# Patient Record
Sex: FEMALE | Race: WHITE | NOT HISPANIC OR LATINO
[De-identification: names, ages, dates, MRNs, and addresses within clinical notes are randomized per-mention and may not be internally consistent; named-entity substitution may affect disease eponyms.]

---

## 2018-03-19 PROBLEM — Z00.00 ENCOUNTER FOR PREVENTIVE HEALTH EXAMINATION: Status: ACTIVE | Noted: 2018-03-19

## 2018-04-02 ENCOUNTER — NON-APPOINTMENT (OUTPATIENT)
Age: 83
End: 2018-04-02

## 2018-04-02 ENCOUNTER — APPOINTMENT (OUTPATIENT)
Dept: CARDIOTHORACIC SURGERY | Facility: CLINIC | Age: 83
End: 2018-04-02
Payer: MEDICARE

## 2018-04-02 ENCOUNTER — OUTPATIENT (OUTPATIENT)
Dept: OUTPATIENT SERVICES | Facility: HOSPITAL | Age: 83
LOS: 1 days | End: 2018-04-02
Payer: MEDICARE

## 2018-04-02 VITALS
HEIGHT: 61 IN | RESPIRATION RATE: 18 BRPM | DIASTOLIC BLOOD PRESSURE: 61 MMHG | SYSTOLIC BLOOD PRESSURE: 128 MMHG | HEART RATE: 79 BPM | OXYGEN SATURATION: 96 % | BODY MASS INDEX: 24.17 KG/M2 | TEMPERATURE: 97.8 F | WEIGHT: 128 LBS

## 2018-04-02 VITALS
OXYGEN SATURATION: 96 % | HEIGHT: 61 IN | DIASTOLIC BLOOD PRESSURE: 61 MMHG | TEMPERATURE: 97.8 F | RESPIRATION RATE: 18 BRPM | BODY MASS INDEX: 24.17 KG/M2 | WEIGHT: 128 LBS | SYSTOLIC BLOOD PRESSURE: 128 MMHG | HEART RATE: 79 BPM

## 2018-04-02 DIAGNOSIS — Z85.038 PERSONAL HISTORY OF OTHER MALIGNANT NEOPLASM OF LARGE INTESTINE: ICD-10-CM

## 2018-04-02 DIAGNOSIS — I35.0 NONRHEUMATIC AORTIC (VALVE) STENOSIS: ICD-10-CM

## 2018-04-02 DIAGNOSIS — Z86.39 PERSONAL HISTORY OF OTHER ENDOCRINE, NUTRITIONAL AND METABOLIC DISEASE: ICD-10-CM

## 2018-04-02 LAB
ALBUMIN SERPL ELPH-MCNC: 3.9 G/DL — SIGNIFICANT CHANGE UP (ref 3.3–5)
ALP SERPL-CCNC: 54 U/L — SIGNIFICANT CHANGE UP (ref 40–120)
ALT FLD-CCNC: 13 U/L — SIGNIFICANT CHANGE UP (ref 10–45)
ANION GAP SERPL CALC-SCNC: 12 MMOL/L — SIGNIFICANT CHANGE UP (ref 5–17)
APTT BLD: 32.8 SEC — SIGNIFICANT CHANGE UP (ref 27.5–37.4)
AST SERPL-CCNC: 19 U/L — SIGNIFICANT CHANGE UP (ref 10–40)
BASOPHILS NFR BLD AUTO: 0.2 % — SIGNIFICANT CHANGE UP (ref 0–2)
BILIRUB SERPL-MCNC: 0.4 MG/DL — SIGNIFICANT CHANGE UP (ref 0.2–1.2)
BUN SERPL-MCNC: 24 MG/DL — HIGH (ref 7–23)
CALCIUM SERPL-MCNC: 9.4 MG/DL — SIGNIFICANT CHANGE UP (ref 8.4–10.5)
CHLORIDE SERPL-SCNC: 102 MMOL/L — SIGNIFICANT CHANGE UP (ref 96–108)
CO2 SERPL-SCNC: 28 MMOL/L — SIGNIFICANT CHANGE UP (ref 22–31)
CREAT SERPL-MCNC: 0.98 MG/DL — SIGNIFICANT CHANGE UP (ref 0.5–1.3)
EOSINOPHIL NFR BLD AUTO: 1.4 % — SIGNIFICANT CHANGE UP (ref 0–6)
GLUCOSE SERPL-MCNC: 160 MG/DL — HIGH (ref 70–99)
HCT VFR BLD CALC: 34.7 % — SIGNIFICANT CHANGE UP (ref 34.5–45)
HGB BLD-MCNC: 11.8 G/DL — SIGNIFICANT CHANGE UP (ref 11.5–15.5)
INR BLD: 1.04 — SIGNIFICANT CHANGE UP (ref 0.88–1.16)
LYMPHOCYTES # BLD AUTO: 24.6 % — SIGNIFICANT CHANGE UP (ref 13–44)
MCHC RBC-ENTMCNC: 32.8 PG — SIGNIFICANT CHANGE UP (ref 27–34)
MCHC RBC-ENTMCNC: 34 G/DL — SIGNIFICANT CHANGE UP (ref 32–36)
MCV RBC AUTO: 96.4 FL — SIGNIFICANT CHANGE UP (ref 80–100)
MONOCYTES NFR BLD AUTO: 9.3 % — SIGNIFICANT CHANGE UP (ref 2–14)
NEUTROPHILS NFR BLD AUTO: 64.5 % — SIGNIFICANT CHANGE UP (ref 43–77)
NT-PROBNP SERPL-SCNC: 1649 PG/ML — HIGH (ref 0–300)
PLATELET # BLD AUTO: 164 K/UL — SIGNIFICANT CHANGE UP (ref 150–400)
POTASSIUM SERPL-MCNC: 3.7 MMOL/L — SIGNIFICANT CHANGE UP (ref 3.5–5.3)
POTASSIUM SERPL-SCNC: 3.7 MMOL/L — SIGNIFICANT CHANGE UP (ref 3.5–5.3)
PROT SERPL-MCNC: 6.9 G/DL — SIGNIFICANT CHANGE UP (ref 6–8.3)
PROTHROM AB SERPL-ACNC: 11.5 SEC — SIGNIFICANT CHANGE UP (ref 9.8–12.7)
RBC # BLD: 3.6 M/UL — LOW (ref 3.8–5.2)
RBC # FLD: 14.7 % — SIGNIFICANT CHANGE UP (ref 10.3–16.9)
SODIUM SERPL-SCNC: 142 MMOL/L — SIGNIFICANT CHANGE UP (ref 135–145)
WBC # BLD: 5 K/UL — SIGNIFICANT CHANGE UP (ref 3.8–10.5)
WBC # FLD AUTO: 5 K/UL — SIGNIFICANT CHANGE UP (ref 3.8–10.5)

## 2018-04-02 PROCEDURE — 85730 THROMBOPLASTIN TIME PARTIAL: CPT

## 2018-04-02 PROCEDURE — 80053 COMPREHEN METABOLIC PANEL: CPT

## 2018-04-02 PROCEDURE — 36415 COLL VENOUS BLD VENIPUNCTURE: CPT

## 2018-04-02 PROCEDURE — 85610 PROTHROMBIN TIME: CPT

## 2018-04-02 PROCEDURE — 85025 COMPLETE CBC W/AUTO DIFF WBC: CPT

## 2018-04-02 PROCEDURE — 99204 OFFICE O/P NEW MOD 45 MIN: CPT

## 2018-04-02 PROCEDURE — 83880 ASSAY OF NATRIURETIC PEPTIDE: CPT

## 2018-04-02 PROCEDURE — 93000 ELECTROCARDIOGRAM COMPLETE: CPT

## 2018-04-03 DIAGNOSIS — I35.0 NONRHEUMATIC AORTIC (VALVE) STENOSIS: ICD-10-CM

## 2018-04-04 PROBLEM — Z86.39 HISTORY OF HYPERCHOLESTEROLEMIA: Status: RESOLVED | Noted: 2018-04-04 | Resolved: 2018-04-04

## 2018-04-04 PROBLEM — Z85.038 HISTORY OF COLON CANCER: Status: RESOLVED | Noted: 2018-04-04 | Resolved: 2018-04-04

## 2018-04-04 PROBLEM — I35.0 AORTIC STENOSIS: Status: ACTIVE | Noted: 2018-04-04

## 2018-04-04 RX ORDER — SIMVASTATIN 40 MG/1
40 TABLET, FILM COATED ORAL DAILY
Qty: 90 | Refills: 3 | Status: ACTIVE | COMMUNITY

## 2018-04-04 RX ORDER — ALPRAZOLAM 0.5 MG/1
0.5 TABLET ORAL
Refills: 0 | Status: ACTIVE | COMMUNITY

## 2018-04-17 RX ORDER — CHLORHEXIDINE GLUCONATE 213 G/1000ML
1 SOLUTION TOPICAL ONCE
Qty: 0 | Refills: 0 | Status: DISCONTINUED | OUTPATIENT
Start: 2018-04-19 | End: 2018-04-20

## 2018-04-18 VITALS
DIASTOLIC BLOOD PRESSURE: 83 MMHG | SYSTOLIC BLOOD PRESSURE: 178 MMHG | HEART RATE: 74 BPM | TEMPERATURE: 98 F | RESPIRATION RATE: 16 BRPM | HEIGHT: 61 IN | OXYGEN SATURATION: 98 % | WEIGHT: 177.91 LBS

## 2018-04-18 RX ORDER — CARVEDILOL PHOSPHATE 80 MG/1
0 CAPSULE, EXTENDED RELEASE ORAL
Qty: 0 | Refills: 0 | COMMUNITY

## 2018-04-18 NOTE — PATIENT PROFILE ADULT. - PSH
H/O Spinal surgery  lumbar  History of bunionectomy of both great toes    History of partial colectomy  2001

## 2018-04-19 ENCOUNTER — OUTPATIENT (OUTPATIENT)
Dept: INPATIENT UNIT | Facility: HOSPITAL | Age: 83
LOS: 1 days | Discharge: HOME | End: 2018-04-19
Payer: MEDICARE

## 2018-04-19 ENCOUNTER — APPOINTMENT (OUTPATIENT)
Dept: CARDIOTHORACIC SURGERY | Facility: HOSPITAL | Age: 83
End: 2018-04-19
Payer: MEDICARE

## 2018-04-19 DIAGNOSIS — I50.32 CHRONIC DIASTOLIC (CONGESTIVE) HEART FAILURE: ICD-10-CM

## 2018-04-19 DIAGNOSIS — Z98.890 OTHER SPECIFIED POSTPROCEDURAL STATES: Chronic | ICD-10-CM

## 2018-04-19 DIAGNOSIS — I25.10 ATHEROSCLEROTIC HEART DISEASE OF NATIVE CORONARY ARTERY WITHOUT ANGINA PECTORIS: ICD-10-CM

## 2018-04-19 DIAGNOSIS — I70.0 ATHEROSCLEROSIS OF AORTA: ICD-10-CM

## 2018-04-19 LAB
ALBUMIN SERPL ELPH-MCNC: 4.1 G/DL — SIGNIFICANT CHANGE UP (ref 3.3–5)
ALP SERPL-CCNC: 59 U/L — SIGNIFICANT CHANGE UP (ref 40–120)
ALT FLD-CCNC: 18 U/L — SIGNIFICANT CHANGE UP (ref 10–45)
ANION GAP SERPL CALC-SCNC: 12 MMOL/L — SIGNIFICANT CHANGE UP (ref 5–17)
APTT BLD: 31.7 SEC — SIGNIFICANT CHANGE UP (ref 27.5–37.4)
AST SERPL-CCNC: 25 U/L — SIGNIFICANT CHANGE UP (ref 10–40)
BASOPHILS NFR BLD AUTO: 0.2 % — SIGNIFICANT CHANGE UP (ref 0–2)
BILIRUB SERPL-MCNC: 0.5 MG/DL — SIGNIFICANT CHANGE UP (ref 0.2–1.2)
BLD GP AB SCN SERPL QL: NEGATIVE — SIGNIFICANT CHANGE UP
BUN SERPL-MCNC: 20 MG/DL — SIGNIFICANT CHANGE UP (ref 7–23)
CALCIUM SERPL-MCNC: 9.9 MG/DL — SIGNIFICANT CHANGE UP (ref 8.4–10.5)
CHLORIDE SERPL-SCNC: 103 MMOL/L — SIGNIFICANT CHANGE UP (ref 96–108)
CHOLEST SERPL-MCNC: 198 MG/DL — SIGNIFICANT CHANGE UP (ref 10–199)
CK MB CFR SERPL CALC: 2 NG/ML — SIGNIFICANT CHANGE UP (ref 0–6.7)
CK SERPL-CCNC: 70 U/L — SIGNIFICANT CHANGE UP (ref 25–170)
CO2 SERPL-SCNC: 27 MMOL/L — SIGNIFICANT CHANGE UP (ref 22–31)
CREAT SERPL-MCNC: 0.96 MG/DL — SIGNIFICANT CHANGE UP (ref 0.5–1.3)
EOSINOPHIL NFR BLD AUTO: 2.1 % — SIGNIFICANT CHANGE UP (ref 0–6)
GLUCOSE SERPL-MCNC: 107 MG/DL — HIGH (ref 70–99)
HBA1C BLD-MCNC: 5.6 % — SIGNIFICANT CHANGE UP (ref 4–5.6)
HCT VFR BLD CALC: 38.5 % — SIGNIFICANT CHANGE UP (ref 34.5–45)
HDLC SERPL-MCNC: 63 MG/DL — SIGNIFICANT CHANGE UP (ref 40–125)
HGB BLD-MCNC: 12.5 G/DL — SIGNIFICANT CHANGE UP (ref 11.5–15.5)
INR BLD: 1.09 — SIGNIFICANT CHANGE UP (ref 0.88–1.16)
LIPID PNL WITH DIRECT LDL SERPL: 113 MG/DL — SIGNIFICANT CHANGE UP
LYMPHOCYTES # BLD AUTO: 25.5 % — SIGNIFICANT CHANGE UP (ref 13–44)
MCHC RBC-ENTMCNC: 31.2 PG — SIGNIFICANT CHANGE UP (ref 27–34)
MCHC RBC-ENTMCNC: 32.5 G/DL — SIGNIFICANT CHANGE UP (ref 32–36)
MCV RBC AUTO: 96 FL — SIGNIFICANT CHANGE UP (ref 80–100)
MONOCYTES NFR BLD AUTO: 13.1 % — SIGNIFICANT CHANGE UP (ref 2–14)
NEUTROPHILS NFR BLD AUTO: 59.1 % — SIGNIFICANT CHANGE UP (ref 43–77)
PLATELET # BLD AUTO: 185 K/UL — SIGNIFICANT CHANGE UP (ref 150–400)
POTASSIUM SERPL-MCNC: 5.1 MMOL/L — SIGNIFICANT CHANGE UP (ref 3.5–5.3)
POTASSIUM SERPL-SCNC: 5.1 MMOL/L — SIGNIFICANT CHANGE UP (ref 3.5–5.3)
PROT SERPL-MCNC: 7.5 G/DL — SIGNIFICANT CHANGE UP (ref 6–8.3)
PROTHROM AB SERPL-ACNC: 12.1 SEC — SIGNIFICANT CHANGE UP (ref 9.8–12.7)
RBC # BLD: 4.01 M/UL — SIGNIFICANT CHANGE UP (ref 3.8–5.2)
RBC # FLD: 14.1 % — SIGNIFICANT CHANGE UP (ref 10.3–16.9)
RH IG SCN BLD-IMP: POSITIVE — SIGNIFICANT CHANGE UP
SODIUM SERPL-SCNC: 142 MMOL/L — SIGNIFICANT CHANGE UP (ref 135–145)
TOTAL CHOLESTEROL/HDL RATIO MEASUREMENT: 3.1 RATIO — LOW (ref 3.3–7.1)
TRIGL SERPL-MCNC: 109 MG/DL — SIGNIFICANT CHANGE UP (ref 10–149)
WBC # BLD: 5.3 K/UL — SIGNIFICANT CHANGE UP (ref 3.8–10.5)
WBC # FLD AUTO: 5.3 K/UL — SIGNIFICANT CHANGE UP (ref 3.8–10.5)

## 2018-04-19 PROCEDURE — 93454 CORONARY ARTERY ANGIO S&I: CPT | Mod: 26

## 2018-04-19 PROCEDURE — 93306 TTE W/DOPPLER COMPLETE: CPT | Mod: 26

## 2018-04-19 PROCEDURE — 99233 SBSQ HOSP IP/OBS HIGH 50: CPT | Mod: 25

## 2018-04-19 RX ORDER — ASPIRIN/CALCIUM CARB/MAGNESIUM 324 MG
81 TABLET ORAL ONCE
Qty: 0 | Refills: 0 | Status: COMPLETED | OUTPATIENT
Start: 2018-04-19 | End: 2018-04-19

## 2018-04-19 RX ORDER — CLOPIDOGREL BISULFATE 75 MG/1
300 TABLET, FILM COATED ORAL ONCE
Qty: 0 | Refills: 0 | Status: COMPLETED | OUTPATIENT
Start: 2018-04-19 | End: 2018-04-19

## 2018-04-19 RX ORDER — SODIUM CHLORIDE 9 MG/ML
1000 INJECTION INTRAMUSCULAR; INTRAVENOUS; SUBCUTANEOUS
Qty: 0 | Refills: 0 | Status: DISCONTINUED | OUTPATIENT
Start: 2018-04-19 | End: 2018-04-20

## 2018-04-19 RX ORDER — ASPIRIN/CALCIUM CARB/MAGNESIUM 324 MG
81 TABLET ORAL DAILY
Qty: 0 | Refills: 0 | Status: DISCONTINUED | OUTPATIENT
Start: 2018-04-20 | End: 2018-04-20

## 2018-04-19 RX ORDER — SODIUM CHLORIDE 9 MG/ML
500 INJECTION INTRAMUSCULAR; INTRAVENOUS; SUBCUTANEOUS
Qty: 0 | Refills: 0 | Status: DISCONTINUED | OUTPATIENT
Start: 2018-04-19 | End: 2018-04-20

## 2018-04-19 RX ADMIN — Medication 81 MILLIGRAM(S): at 16:47

## 2018-04-19 RX ADMIN — CLOPIDOGREL BISULFATE 300 MILLIGRAM(S): 75 TABLET, FILM COATED ORAL at 16:47

## 2018-04-19 NOTE — H&P ADULT - ASSESSMENT
88 y/o F with severe AS being evaluated for TAVR with R/L heart cath today   - consented for above procedure   - will admit for observation overnight

## 2018-04-19 NOTE — PROGRESS NOTE ADULT - SUBJECTIVE AND OBJECTIVE BOX
Discussed with Dr. Cobos    HPI  88 y/o F with history of HTN, HLD< Colon CA s/p resection (2001, no RTx or chemo), chornic diastolic CHF< with severe AS who was referred for further evaluation of her AS. The patient states for the last 6 months she has been experiencing dysnea, which has progressed to dyspnea with her daily household activities like getting dressed and going to the bathroom.  DYspnea is associated with mild chest pressure and symptoms are relieved with a few minutes of rest.  She recently had an outpatient echo on 3/13/18 showing severe aortic stenosis with OSMAR of 0.7cm^2.  THe patient denies any SOB at rest, palpitations, dizziesk or LE edema    T(C): --  HR: --  BP: --  RR: --  SpO2: --  Wt(kg): --  Daily     Daily     Physical Exam: HEENT: NCAT, EOMI, PERRLA  NECK: No JVD, No carotid bruits B/L, +2 Carotid pulses B/L  PULM:  CTA B/L No W/R/R  CARD: RRR, +S1 +S2,  + systolic murmur  ABD: ND, +BS, NT, no masses  EXT: Warm, No pedal edema  NEURO: A & O x 3, no focal neurologic deficits  PULSES:      B           R          FEM                       DP        PT       Right               2+       2+ No         Bruit 2+        2+      Left                  2+                  2+  No         Bruit 2+    2+	                            12.5   5.3   )-----------( 185      ( 19 Apr 2018 15:11 )             38.5     04-19    142  |  103  |  20  ----------------------------<  107<H>  5.1   |  27  |  0.96    Ca    9.9      19 Apr 2018 15:11    TPro  7.5  /  Alb  4.1  /  TBili  0.5  /  DBili  x   /  AST  25  /  ALT  18  /  AlkPhos  59  04-19

## 2018-04-19 NOTE — CONSULT NOTE ADULT - ASSESSMENT
86 y/o F with hx of HTN, HLD, Chronic DIastolic CHF, COlon CA s/p, and AS    - complete TAVR workup with cath, labs, carotids    - patient's advanced age and comorbidities would make surgical AVR a high risk surgery.

## 2018-04-19 NOTE — CONSULT NOTE ADULT - SUBJECTIVE AND OBJECTIVE BOX
Surgeon: Dr. Magaña    Requesting Physician: Dr. Moore    HISTORY OF PRESENT ILLNESS:    86 y/o F with history of HTN, HLD, Colon CA s/p resection (2001, no RTx or chemo), chornic diastolic CHF< with severe AS who was referred for further evaluation of her AS. The patient states for the last 6 months she has been experiencing dysnea, which has progressed to dyspnea with her daily household activities like getting dressed and going to the bathroom.  DYspnea is associated with mild chest pressure and symptoms are relieved with a few minutes of rest.  She recently had an outpatient echo on 3/13/18 showing severe aortic stenosis with OSMAR of 0.7cm^2.  THe patient denies any SOB at rest, palpitations, dizziesk or LE edema    PAST MEDICAL & SURGICAL HISTORY:  History of colon cancer  Hypercholesteremia  HTN (hypertension)  Aortic stenosis  H/O Spinal surgery: lumbar  History of bunionectomy of both great toes  History of partial colectomy: 2001      MEDICATIONS  (STANDING):  aspirin enteric coated 81 milliGRAM(s) Oral once  chlorhexidine 4% Liquid 1 Application(s) Topical once  clopidogrel Tablet 300 milliGRAM(s) Oral once  sodium chloride 0.9%. 500 milliLiter(s) (30 mL/Hr) IV Continuous <Continuous>    MEDICATIONS  (PRN):      Allergies    codeine (Unknown)    Intolerances        SOCIAL HISTORY:  Smoker:  NO         ETOH use:   NO                 Ilicit Drug use:   NO  Occupation: former RN    FAMILY HISTORY:      Review of Systems  Constitutional: feeling fatigued  Cardiovascular: shortness of breath and dyspnea during exertion  Eyes, REspiratory, GI, , MS, integumnetary, Neuro, Psych, Endocfine Heme-lymph are otherwise negative    PHYSICAL EXAM  Vital Signs Last 24 Hrs  T(C): --  T(F): --  HR: --  BP: --  BP(mean): --  RR: --  SpO2: --     CONST: NAD, WN/WD   HEENT: NCAT, EOMI, PERRLA  	NECK: No JVD, No carotid bruits B/L, +2 Carotid pulses B/L  	PULM:  CTA B/L No W/R/R  	CARD: RRR, +S1 +S2,  + systolic murmur  	ABD: ND, +BS, NT, no masses  	EXT: Warm, No pedal edema  	NEURO: A & O x 3, no focal neurologic deficits  	PULSES:	     B	          R	      	  FEM          		           DP        PT  	     Right              	2+		     2+ No	        Bruit	2+        2+       Left       	         	2+	                 2+  No	        Bruit	2+   	2+                                                          LABS:                        12.5   5.3   )-----------( 185      ( 19 Apr 2018 15:11 )             38.5     04-19    142  |  103  |  20  ----------------------------<  107<H>  5.1   |  27  |  0.96    Ca    9.9      19 Apr 2018 15:11    TPro  7.5  /  Alb  4.1  /  TBili  0.5  /  DBili  x   /  AST  25  /  ALT  18  /  AlkPhos  59  04-19    PT/INR - ( 19 Apr 2018 15:11 )   PT: 12.1 sec;   INR: 1.09          PTT - ( 19 Apr 2018 15:11 )  PTT:31.7 sec    CARDIAC MARKERS ( 19 Apr 2018 15:11 )  x     / x     / 70 U/L / x     / 2.0 ng/mL          RADIOLOGY & ADDITIONAL STUDIES:  CAROTID U/S:    CXR: pending    CT Scan: pending    EKG: pending    TTE / JULIO CESAR: severe AS OSMAR 0.7    Cardiac Cath: pending

## 2018-04-19 NOTE — H&P ADULT - NSHPREVIEWOFSYSTEMS_GEN_ALL_CORE
Constitutional: feeling fatigued  Cardiovascular: shortness of breath and dyspnea during exertion  Eyes, REspiratory, GI, , MS, integumnetary, Neuro, Psych, Endocfine Heme-lymph are otherwise negative

## 2018-04-19 NOTE — H&P ADULT - HISTORY OF PRESENT ILLNESS
88 y/o F with history of HTN, HLD< Colon CA s/p resection (2001, no RTx or chemo), chornic diastolic CHF< with severe AS who was referred for further evaluation of her AS. The patient states for the last 6 months she has been experiencing dysnea, which has progressed to dyspnea with her daily household activities like getting dressed and going to the bathroom.  DYspnea is associated with mild chest pressure and symptoms are relieved with a few minutes of rest.  She recently had an outpatient echo on 3/13/18 showing severe aortic stenosis with OSMAR of 0.7cm^2.  THe patient denies any SOB at rest, palpitations, dizziesk or LE edema    SHe is a retired RN, originally from [place].  SHe lives alone in an apartment and continues to be independent in daily acitivities.  Nearest relative is in [place]

## 2018-04-19 NOTE — BRIEF OPERATIVE NOTE - PROCEDURE
Cardiac catheterization  04/19/2018    Active  CKLIGER <<-----Click on this checkbox to enter Procedure

## 2018-04-19 NOTE — BRIEF OPERATIVE NOTE - POST-OP DX
Chronic diastolic congestive heart failure, NYHA class 3  04/19/2018    Active  Ceasar Moore  Coronary artery disease of native artery of native heart with stable angina pectoris  04/19/2018    Active  Ceasar Moore

## 2018-04-19 NOTE — H&P ADULT - NSHPPHYSICALEXAM_GEN_ALL_CORE
HEENT: NCAT, EOMI, PERRLA  NECK: No JVD, No carotid bruits B/L, +2 Carotid pulses B/L  PULM:  CTA B/L No W/R/R  CARD: RRR, +S1 +S2,  + systolic murmur  ABD: ND, +BS, NT, no masses  EXT: Warm, No pedal edema  NEURO: A & O x 3, no focal neurologic deficits  PULSES:	     B	          R	      	  FEM          		           DP        PT       Right              	2+		     2+ No	        Bruit	2+        2+       Left       	         	2+	                 2+  No	        Bruit	2+   	2+

## 2018-04-19 NOTE — BRIEF OPERATIVE NOTE - OPERATION/FINDINGS
6F slender sheath  LHC: rt dominant system, RCA 70% proximal calcific, 100% total occlusion; LMCA 20% distal discrete stenosis; LAD 40% proximal calcific disease, DIAG1 80% ostial discrete (small territory vessel); LCx 40% ostial discrete stenosis  hemostasis: TR band  imp: severe AS; chronic diastolic CHF; 1V CAD with total occlusion of mid RCA  plan:  -routine post-catheterization care  -TAVR w/u  -admit for workup/monitoring

## 2018-04-19 NOTE — PROGRESS NOTE ADULT - SUBJECTIVE AND OBJECTIVE BOX
[facility] Interventional Cardiology Addendum Note to Structural Heart AMBI H&P:     Attending MD: Dr Ceasar Moore        S: Pt presents for Right and Left Heart Catheterization (see office H&P for detailed History).  Pt reports that today she is feeling well. Patient does endorse SOB and mild sub-sternal chest pressure while completing ADLs such as dressing and walking to the bathroom. Symptoms are relieved with rest. In light of patient's known severe AS by echo (OSMAR 0.7) and continued symptoms, patient is now recommended for right and left cardiac catheterization for TAVR workup. Patient is to complete in house echo prior to cath.        Allergies    codeine (itching)      Current Medications:   alprazolam 0.5mg TID  ASA 81mg QD  coreg 12.5mg BID  HCTZ 12.5mg QD  simvastatin 40mg QHS      PHYSICAL EXAM    V/S		BP:	178/83	        HR:	  74         RR: 	16	  TEMP: 97.8F    General:   HEENT: NCAT, EOMI, PERRLA  NECK: No JVD, No carotid bruits B/L, +2 Carotid pulses B/L  PULM:  CTA B/L No W/R/R  CARD: RRR, +S1 +S2,  + systolic murmur  ABD: ND, +BS, NT, no masses  EXT: Warm, No pedal edema  NEURO: A & O x 3, no focal neurologic deficits  PULSES:	     B	          R	      	  FEM          		           DP        PT       Right              	2+		     2+ No	        Bruit	2+        2+       Left       	         	2+	                 2+  No	        Bruit	2+   	2+	                                                              LABS:                        EKG: NSR @ 72bpm with TWI in AVL    ASA 3				Mallampati class: 2	    A/P:  Patient is an 86yo F with PMHx of HTN, HLD, colon CA s/p colon resection (2001), chronic diastolic CHF, severe AS (OSMAR 0.7) who presents to [facility] for recommended right and left cardiac catheterization for TAVR workup.    Risks & benefits of procedure and sedation and risks and benefits for the alternative therapy have been explained to the patient in layman’s terms including but not limited to: allergic reaction, bleeding, infection, arrhythmia, respiratory compromise, renal and vascular compromise, limb damage, MI, CVA, emergent CABG/Vascular Surgery and death. Informed consent obtained and in chart. [facility] Interventional Cardiology Addendum Note to Structural Heart AMBI H&P:     Attending MD: Dr Ceasar Moore        S: Pt presents for Right and Left Heart Catheterization (see office H&P for detailed History).  Pt reports that today she is feeling well. Patient does endorse SOB and mild sub-sternal chest pressure while completing ADLs such as dressing and walking to the bathroom. Symptoms are relieved with rest. In light of patient's known severe AS by echo (OSMAR 0.7) and continued symptoms, patient is now recommended for right and left cardiac catheterization for TAVR workup. Patient is to complete in house echo prior to cath.        Allergies    codeine (itching)      Current Medications:   alprazolam 0.5mg TID  ASA 81mg QD  coreg 12.5mg BID  HCTZ 12.5mg QD  simvastatin 40mg QHS      PHYSICAL EXAM    V/S		BP:	178/83	        HR:	  74         RR: 	16	  TEMP: 97.8F    General:   HEENT: NCAT, EOMI, PERRLA  NECK: No JVD, No carotid bruits B/L, +2 Carotid pulses B/L  PULM:  CTA B/L No W/R/R  CARD: RRR, +S1 +S2,  + systolic murmur  ABD: ND, +BS, NT, no masses  EXT: Warm, No pedal edema  NEURO: A & O x 3, no focal neurologic deficits  PULSES:	     B	          R	      	  FEM          		           DP        PT       Right              	2+		     2+ No	        Bruit	2+        2+       Left       	         	2+	                 2+  No	        Bruit	2+   	2+	                                                              LABS:                        12.5   5.3   )-----------( 185      ( 19 Apr 2018 15:11 )             38.5       04-19    142  |  103  |  20  ----------------------------<  107<H>  5.1   |  27  |  0.96    Ca    9.9      19 Apr 2018 15:11    TPro  7.5  /  Alb  4.1  /  TBili  0.5  /  DBili  x   /  AST  25  /  ALT  18  /  AlkPhos  59  04-19      PT/INR - ( 19 Apr 2018 15:11 )   PT: 12.1 sec;   INR: 1.09          PTT - ( 19 Apr 2018 15:11 )  PTT:31.7 sec    CARDIAC MARKERS ( 19 Apr 2018 15:11 )  x     / x     / 70 U/L / x     / 2.0 ng/mL        EKG: NSR @ 72bpm with TWI in AVL    ASA 3				Mallampati class: 2	    A/P:  Patient is an 86yo F with PMHx of HTN, HLD, colon CA s/p colon resection (2001), chronic diastolic CHF, severe AS (OSMAR 0.7) who presents to [facility] for recommended right and left cardiac catheterization for TAVR workup.    OF NOTE: Patient given aspirin 81mg and plavix 300mg prior to procedure. Dr. Moore aware of elevated BP, will sedate and tx in room if necessary. NS @ 30cc/hr KVO.    Risks & benefits of procedure and sedation and risks and benefits for the alternative therapy have been explained to the patient in layman’s terms including but not limited to: allergic reaction, bleeding, infection, arrhythmia, respiratory compromise, renal and vascular compromise, limb damage, MI, CVA, emergent CABG/Vascular Surgery and death. Informed consent obtained and in chart.

## 2018-04-20 ENCOUNTER — TRANSCRIPTION ENCOUNTER (OUTPATIENT)
Age: 83
End: 2018-04-20

## 2018-04-20 VITALS
OXYGEN SATURATION: 94 % | SYSTOLIC BLOOD PRESSURE: 135 MMHG | HEART RATE: 80 BPM | DIASTOLIC BLOOD PRESSURE: 92 MMHG | RESPIRATION RATE: 18 BRPM

## 2018-04-20 PROBLEM — I10 ESSENTIAL (PRIMARY) HYPERTENSION: Chronic | Status: ACTIVE | Noted: 2018-04-18

## 2018-04-20 PROBLEM — Z85.038 PERSONAL HISTORY OF OTHER MALIGNANT NEOPLASM OF LARGE INTESTINE: Chronic | Status: ACTIVE | Noted: 2018-04-18

## 2018-04-20 PROBLEM — I35.0 NONRHEUMATIC AORTIC (VALVE) STENOSIS: Chronic | Status: ACTIVE | Noted: 2018-04-18

## 2018-04-20 PROBLEM — E78.00 PURE HYPERCHOLESTEROLEMIA, UNSPECIFIED: Chronic | Status: ACTIVE | Noted: 2018-04-18

## 2018-04-20 PROCEDURE — 94010 BREATHING CAPACITY TEST: CPT | Mod: 26

## 2018-04-20 PROCEDURE — 85610 PROTHROMBIN TIME: CPT

## 2018-04-20 PROCEDURE — 94150 VITAL CAPACITY TEST: CPT

## 2018-04-20 PROCEDURE — 82553 CREATINE MB FRACTION: CPT

## 2018-04-20 PROCEDURE — 74174 CTA ABD&PLVS W/CONTRAST: CPT

## 2018-04-20 PROCEDURE — 83036 HEMOGLOBIN GLYCOSYLATED A1C: CPT

## 2018-04-20 PROCEDURE — 75573 CT HRT C+ STRUX CGEN HRT DS: CPT

## 2018-04-20 PROCEDURE — 70450 CT HEAD/BRAIN W/O DYE: CPT

## 2018-04-20 PROCEDURE — 74174 CTA ABD&PLVS W/CONTRAST: CPT | Mod: 26

## 2018-04-20 PROCEDURE — 93306 TTE W/DOPPLER COMPLETE: CPT

## 2018-04-20 PROCEDURE — 85730 THROMBOPLASTIN TIME PARTIAL: CPT

## 2018-04-20 PROCEDURE — 75573 CT HRT C+ STRUX CGEN HRT DS: CPT | Mod: 26

## 2018-04-20 PROCEDURE — 86850 RBC ANTIBODY SCREEN: CPT

## 2018-04-20 PROCEDURE — 80061 LIPID PANEL: CPT

## 2018-04-20 PROCEDURE — 93454 CORONARY ARTERY ANGIO S&I: CPT

## 2018-04-20 PROCEDURE — 90662 IIV NO PRSV INCREASED AG IM: CPT

## 2018-04-20 PROCEDURE — 74176 CT ABD & PELVIS W/O CONTRAST: CPT

## 2018-04-20 PROCEDURE — 36415 COLL VENOUS BLD VENIPUNCTURE: CPT

## 2018-04-20 PROCEDURE — C1887: CPT

## 2018-04-20 PROCEDURE — 80053 COMPREHEN METABOLIC PANEL: CPT

## 2018-04-20 PROCEDURE — 82550 ASSAY OF CK (CPK): CPT

## 2018-04-20 PROCEDURE — 85025 COMPLETE CBC W/AUTO DIFF WBC: CPT

## 2018-04-20 PROCEDURE — 86900 BLOOD TYPING SEROLOGIC ABO: CPT

## 2018-04-20 PROCEDURE — 70450 CT HEAD/BRAIN W/O DYE: CPT | Mod: 26

## 2018-04-20 PROCEDURE — 86923 COMPATIBILITY TEST ELECTRIC: CPT

## 2018-04-20 PROCEDURE — 93880 EXTRACRANIAL BILAT STUDY: CPT

## 2018-04-20 PROCEDURE — 99238 HOSP IP/OBS DSCHRG MGMT 30/<: CPT

## 2018-04-20 PROCEDURE — 93880 EXTRACRANIAL BILAT STUDY: CPT | Mod: 26

## 2018-04-20 PROCEDURE — C1769: CPT

## 2018-04-20 PROCEDURE — 86901 BLOOD TYPING SEROLOGIC RH(D): CPT

## 2018-04-20 RX ORDER — CEPHALEXIN 500 MG
1 CAPSULE ORAL
Qty: 14 | Refills: 0 | OUTPATIENT
Start: 2018-04-20 | End: 2018-04-26

## 2018-04-20 RX ORDER — INFLUENZA VIRUS VACCINE 15; 15; 15; 15 UG/.5ML; UG/.5ML; UG/.5ML; UG/.5ML
0.5 SUSPENSION INTRAMUSCULAR ONCE
Qty: 0 | Refills: 0 | Status: COMPLETED | OUTPATIENT
Start: 2018-04-20 | End: 2018-04-20

## 2018-04-20 RX ORDER — CEPHALEXIN 500 MG
1 CAPSULE ORAL
Qty: 60 | Refills: 0 | OUTPATIENT
Start: 2018-04-20 | End: 2018-05-19

## 2018-04-20 RX ORDER — CARVEDILOL PHOSPHATE 80 MG/1
12.5 CAPSULE, EXTENDED RELEASE ORAL EVERY 12 HOURS
Qty: 0 | Refills: 0 | Status: DISCONTINUED | OUTPATIENT
Start: 2018-04-20 | End: 2018-04-20

## 2018-04-20 RX ADMIN — INFLUENZA VIRUS VACCINE 0.5 MILLILITER(S): 15; 15; 15; 15 SUSPENSION INTRAMUSCULAR at 15:55

## 2018-04-20 RX ADMIN — CARVEDILOL PHOSPHATE 12.5 MILLIGRAM(S): 80 CAPSULE, EXTENDED RELEASE ORAL at 09:27

## 2018-04-20 RX ADMIN — Medication 81 MILLIGRAM(S): at 09:27

## 2018-04-20 NOTE — DISCHARGE NOTE ADULT - MEDICATION SUMMARY - MEDICATIONS TO TAKE
I will START or STAY ON the medications listed below when I get home from the hospital:    warm compresses- apply to affected area as needed for pain or swelling  -- Warm compress: Apply to affected area up to 3 times daily for pain or swelling.   -- Indication: For Warm compresses     aspirin 81 mg oral tablet  -- 1 tab(s) by mouth once a day  -- Indication: For Blood thinner    simvastatin 40 mg oral tablet  -- 1 tab(s) by mouth once a day (at bedtime)  -- Indication: For Cholesterol    ALPRAZolam 0.5 mg oral tablet  -- 1 tab(s) by mouth 3 times a day, As Needed  -- Indication: For Anxiety    Coreg 12.5 mg oral tablet  -- orally 2 times a day  -- Indication: For Blood pressure    Keflex 500 mg oral capsule  -- 1 cap(s) by mouth every 12 hours   -- Finish all this medication unless otherwise directed by prescriber.    -- Indication: For Antibiotic for 7 days    hydroCHLOROthiazide 25 mg oral tablet  -- 1 tab(s) by mouth once a day  -- Indication: For Blood pressure

## 2018-04-20 NOTE — DISCHARGE NOTE ADULT - PLAN OF CARE
Recover from procedure -Please follow up with Dr. Moore on .  The office is located at [facility], [facility], 4th floor. Call us with any questions  #[phone].    -Walk daily as tolerated and use your incentive spirometer every hour.    -No driving or strenuous activity/exercise for 6 weeks, or until  cleared by your surgeon.    -Gently clean your incisions with anti-bacterial soap and water, pat  dry.  You may leave them open to air.    -Call your doctor if you have shortness of breath, chest pain not  relieved by pain medication, dizziness, fever >101.5, or increased  redness or drainage from incisions. -Please follow up with Dr. Moore on 4/30/18 at 1:00pm.  The office is located at [facility], [facility], 4th floor. Call us with any questions #[phone].    -Dr. Sylvia Gibson with follow-up with you on 5/4/18 at 11:30am.  Her office location and phone number are included below.     -Walk daily as tolerated and use your incentive spirometer every hour.    -No driving or strenuous activity/exercise for 6 weeks, or until  cleared by your surgeon.    -Gently clean your incisions with anti-bacterial soap and water, pat  dry.  You may leave them open to air.    -Call your doctor if you have shortness of breath, chest pain not  relieved by pain medication, dizziness, fever >101.5, or increased  redness or drainage from incisions.

## 2018-04-20 NOTE — DISCHARGE NOTE ADULT - HOSPITAL COURSE
This is an 87 year old female with history of HTN, HLD, colon CA s/p resection in 2001, diastolic CHF with EF 75% who presented to her cardiologist complaining of progressive ROBERTS over the last 6 months with associated mild chest pressure.  She underwent ECHO as an outpatient which demonstrated OSMAR 0.7cm2, peak/mean gradients 76/44mmHg.  She was referred to Dr. Moore for further evaluation of her valvular disease. On 4/19/18, she was admitted to [facility] under the care of Dr. Moore where she underwent diagnostic R/L cardiac cath which revealed right dominant system, pRCA 70%, 20% LM, 40% pLAD, 80% D1 ostial (small territory vessel), and 40% LCx.  She was admitted overnight for post-cath care with no acute issues overnight.  On 4/20/18, post-procedure day 1, she completed the remainder of her pre-TAVR evaluation and was cleared for discharge to home with plan for outpatient follow-up with Dr. Moore.  IV infiltrated during CT scan after results were obtained.  Site was evaluated with no obvious erythema or pain, cleared for discharge per Dr. Magaña on 7 days Keflex and warm compresses.  Prior to discharge, medication regimen and home instructions discussed thoroughly with the patient.

## 2018-04-20 NOTE — PROGRESS NOTE ADULT - ASSESSMENT
-Please follow up with Dr. Moore on 4/30/18 at 1:00pm.  The office is located at [facility], [facility], 4th floor. Call us with any questions #[phone].    -Dr. Sylvia Gibson with follow-up with you on 5/4/18 at 11:30am.  Her office location and phone number are included below.

## 2018-04-20 NOTE — PROGRESS NOTE ADULT - SUBJECTIVE AND OBJECTIVE BOX
Patient discussed on morning rounds with Dr. Magaña     Operation / Date: s/p R/L Cardiac Cath, pre-op TAVR    Surgeon: DR. Moore     SUBJECTIVE ASSESSMENT:  87y Female seen at bedside this AM.  No acute events overnight.  Denies HA, AMS, CP, palpitations, SOB, cough, hemoptysis, n/v/d, fever.     Hospital Course:  This is an 87 year old female with history of HTN, HLD, colon CA s/p resection in 2001, diastolic CHF with EF 75% who presented to her cardiologist complaining of progressive ROBERTS over the last 6 months with associated mild chest pressure.  She underwent ECHO as an outpatient which demonstrated OSMAR 0.7cm2, peak/mean gradients 76/44mmHg.  She was referred to Dr. Moore for further evaluation of her valvular disease. On 4/19/18, she was admitted to [facility] under the care of Dr. Moore where she underwent diagnostic R/L cardiac cath which revealed right dominant system, pRCA 70%, 20% LM, 40% pLAD, 80% D1 ostial (small territory vessel), and 40% LCx.  She was admitted overnight for post-cath care with no acute issues overnight.  On 4/20/18, post-procedure day 1, she completed the remainder of her pre-TAVR evaluation and was cleared for discharge to home with plan for outpatient follow-up with Dr. Moore.  IV infiltrated during CT scan after results were obtained.  Site was evaluated with no obvious erythema or pain, cleared for discharge per Dr. Magaña on 7 days Keflex and warm compresses.  Prior to discharge, medication regimen and home instructions discussed thoroughly with the patient.       Vital Signs Last 24 Hrs  T(C): 36.4 (20 Apr 2018 10:26), Max: 36.7 (20 Apr 2018 05:44)  T(F): 97.6 (20 Apr 2018 10:26), Max: 98 (20 Apr 2018 05:44)  HR: 66 (20 Apr 2018 08:56) (62 - 72)  BP: 160/79 (20 Apr 2018 08:56) (121/57 - 171/88)  BP(mean): 107 (20 Apr 2018 08:56) (77 - 140)  RR: 17 (20 Apr 2018 08:56) (17 - 22)  SpO2: 96% (20 Apr 2018 08:40) (96% - 97%)  I&O's Detail    19 Apr 2018 07:01  -  20 Apr 2018 07:00  --------------------------------------------------------  IN:    Oral Fluid: 450 mL  Total IN: 450 mL    OUT:    Voided: 400 mL  Total OUT: 400 mL    Total NET: 50 mL    EPICARDIAL WIRES REMOVED: NA.  TIE DOWNS REMOVED: NA.    PHYSICAL EXAM:  General: OOB to chair, no acute distress.   Neurological: AAOx3, no AMS or focal deficits.   Cardiovascular: RRR, S1/S2.  +DEXTER II/VI at RUSB, no r/g.   Respiratory: No distress on RA.  CTA b/l, no w/r/r.   Gastrointestinal: ND, NBS, non-TTP.  Extremities: Warm and well perfused, no calf ttp or edema b/l.   Vascular: Pulses 2+  Incision Sites: R rad cath site: C/D/I    LABS:                        12.5   5.3   )-----------( 185      ( 19 Apr 2018 15:11 )             38.5       COUMADIN:  No.     PT/INR - ( 19 Apr 2018 15:11 )   PT: 12.1 sec;   INR: 1.09          PTT - ( 19 Apr 2018 15:11 )  PTT:31.7 sec    04-19    142  |  103  |  20  ----------------------------<  107<H>  5.1   |  27  |  0.96    Ca    9.9      19 Apr 2018 15:11    TPro  7.5  /  Alb  4.1  /  TBili  0.5  /  DBili  x   /  AST  25  /  ALT  18  /  AlkPhos  59  04-19    MEDICATIONS  (STANDING):  aspirin  chewable 81 milliGRAM(s) Chew daily  carvedilol 12.5 milliGRAM(s) Oral every 12 hours  chlorhexidine 4% Liquid 1 Application(s) Topical once  sodium chloride 0.9%. 500 milliLiter(s) (30 mL/Hr) IV Continuous <Continuous>  sodium chloride 0.9%. 1000 milliLiter(s) (10 mL/Hr) IV Continuous <Continuous>    Discharge ECHO:  < from: Echocardiogram (04.19.18 @ 16:00) >  Interpretation Summary  There is moderate asymmetric septal ventricular hypertrophy.There is no   evidence for left ventricular outflow obstruction.Probably normal left   ventricular wall motion.The left ventricle is hyperdynamic and the   overall   ejection fraction is increased (>75%).  Right atrial size is normal.The   right   ventricle is normal in size and function.There is severe aortic valve   thickening.No aortic regurgitation noted.There is Severe aortic   stenosis.The   peak pressure gradient is 76 mmHg.The mean pressure gradient is 44   mmHg.The   calculated aortic valve area using the continuity equation is 0.5   cm2.There is   moderate mitral valve thickening.There is trace to mild mitral   regurgitation.Structurally normal tricuspid valve.There is trace   tricuspid   regurgitation.There is mild pulmonary hypertension.The pulmonary artery   systolic pressure is estimated to be 43 mmHg.Structurally normal pulmonic   valve.No aortic root dilatation.There is no pericardial effusion.    < end of copied text >

## 2018-04-20 NOTE — DISCHARGE NOTE ADULT - CARE PROVIDER_API CALL
Ceasar Moore), Cardiology; Interventional Cardiology  [address]  [address]th [place] [address]  Phone: [phone]  Fax: [phone]    Sylvia Gibson), Cardiovascular Medicine  [address]  [address]  [place] [address]  Phone: [phone]  Fax: [phone]

## 2018-04-20 NOTE — DISCHARGE NOTE ADULT - PATIENT PORTAL LINK FT
You can access the [facility] Portal, offered by [facility], by registering with the following website: [email or web address]

## 2018-04-20 NOTE — DISCHARGE NOTE ADULT - CARE PLAN
Principal Discharge DX:	Aortic stenosis  Goal:	Recover from procedure  Assessment and plan of treatment:	-Please follow up with Dr. Moore on .  The office is located at [facility], [facility], 4th floor. Call us with any questions  #[phone].    -Walk daily as tolerated and use your incentive spirometer every hour.    -No driving or strenuous activity/exercise for 6 weeks, or until  cleared by your surgeon.    -Gently clean your incisions with anti-bacterial soap and water, pat  dry.  You may leave them open to air.    -Call your doctor if you have shortness of breath, chest pain not  relieved by pain medication, dizziness, fever >101.5, or increased  redness or drainage from incisions. Principal Discharge DX:	Aortic stenosis  Goal:	Recover from procedure  Assessment and plan of treatment:	-Please follow up with Dr. Moore on 4/30/18 at 1:00pm.  The office is located at [facility], [facility], 4th floor. Call us with any questions #[phone].    -Dr. Sylvia Gibson with follow-up with you on 5/4/18 at 11:30am.  Her office location and phone number are included below.     -Walk daily as tolerated and use your incentive spirometer every hour.    -No driving or strenuous activity/exercise for 6 weeks, or until  cleared by your surgeon.    -Gently clean your incisions with anti-bacterial soap and water, pat  dry.  You may leave them open to air.    -Call your doctor if you have shortness of breath, chest pain not  relieved by pain medication, dizziness, fever >101.5, or increased  redness or drainage from incisions.

## 2018-04-30 ENCOUNTER — APPOINTMENT (OUTPATIENT)
Dept: CARDIOTHORACIC SURGERY | Facility: CLINIC | Age: 83
End: 2018-04-30
Payer: MEDICARE

## 2018-04-30 VITALS
BODY MASS INDEX: 23.81 KG/M2 | TEMPERATURE: 97.8 F | WEIGHT: 126 LBS | OXYGEN SATURATION: 99 % | SYSTOLIC BLOOD PRESSURE: 146 MMHG | RESPIRATION RATE: 19 BRPM | HEART RATE: 84 BPM | DIASTOLIC BLOOD PRESSURE: 64 MMHG

## 2018-04-30 DIAGNOSIS — I50.32 CHRONIC DIASTOLIC (CONGESTIVE) HEART FAILURE: ICD-10-CM

## 2018-04-30 PROCEDURE — 99214 OFFICE O/P EST MOD 30 MIN: CPT

## 2018-05-01 PROBLEM — I50.32 CHRONIC DIASTOLIC CONGESTIVE HEART FAILURE: Status: ACTIVE | Noted: 2018-05-01

## 2018-06-11 VITALS
TEMPERATURE: 98 F | RESPIRATION RATE: 18 BRPM | HEART RATE: 73 BPM | OXYGEN SATURATION: 98 % | DIASTOLIC BLOOD PRESSURE: 81 MMHG | SYSTOLIC BLOOD PRESSURE: 185 MMHG | HEIGHT: 61 IN | WEIGHT: 125 LBS

## 2018-06-12 ENCOUNTER — APPOINTMENT (OUTPATIENT)
Dept: CARDIOTHORACIC SURGERY | Facility: HOSPITAL | Age: 83
End: 2018-06-12
Payer: MEDICARE

## 2018-06-12 ENCOUNTER — INPATIENT (INPATIENT)
Facility: HOSPITAL | Age: 83
LOS: 2 days | Discharge: HOME HEALTH | DRG: 267 | End: 2018-06-15
Attending: INTERNAL MEDICINE | Admitting: INTERNAL MEDICINE
Payer: MEDICARE

## 2018-06-12 DIAGNOSIS — Z98.890 OTHER SPECIFIED POSTPROCEDURAL STATES: Chronic | ICD-10-CM

## 2018-06-12 LAB
ALBUMIN SERPL ELPH-MCNC: 3 G/DL — LOW (ref 3.3–5)
ALBUMIN SERPL ELPH-MCNC: 3.2 G/DL — LOW (ref 3.3–5)
ALBUMIN SERPL ELPH-MCNC: 4.2 G/DL — SIGNIFICANT CHANGE UP (ref 3.3–5)
ALP SERPL-CCNC: 39 U/L — LOW (ref 40–120)
ALP SERPL-CCNC: 46 U/L — SIGNIFICANT CHANGE UP (ref 40–120)
ALP SERPL-CCNC: 52 U/L — SIGNIFICANT CHANGE UP (ref 40–120)
ALT FLD-CCNC: 15 U/L — SIGNIFICANT CHANGE UP (ref 10–45)
ALT FLD-CCNC: 18 U/L — SIGNIFICANT CHANGE UP (ref 10–45)
ALT FLD-CCNC: 21 U/L — SIGNIFICANT CHANGE UP (ref 10–45)
ANION GAP SERPL CALC-SCNC: 10 MMOL/L — SIGNIFICANT CHANGE UP (ref 5–17)
ANION GAP SERPL CALC-SCNC: 12 MMOL/L — SIGNIFICANT CHANGE UP (ref 5–17)
ANION GAP SERPL CALC-SCNC: 13 MMOL/L — SIGNIFICANT CHANGE UP (ref 5–17)
APTT BLD: 29.6 SEC — SIGNIFICANT CHANGE UP (ref 27.5–37.4)
APTT BLD: 32.1 SEC — SIGNIFICANT CHANGE UP (ref 27.5–37.4)
APTT BLD: 32.9 SEC — SIGNIFICANT CHANGE UP (ref 27.5–37.4)
AST SERPL-CCNC: 19 U/L — SIGNIFICANT CHANGE UP (ref 10–40)
AST SERPL-CCNC: 35 U/L — SIGNIFICANT CHANGE UP (ref 10–40)
AST SERPL-CCNC: 38 U/L — SIGNIFICANT CHANGE UP (ref 10–40)
BASOPHILS NFR BLD AUTO: 0.1 % — SIGNIFICANT CHANGE UP (ref 0–2)
BILIRUB DIRECT SERPL-MCNC: <0.2 MG/DL — SIGNIFICANT CHANGE UP (ref 0–0.2)
BILIRUB INDIRECT FLD-MCNC: >0.2 MG/DL — SIGNIFICANT CHANGE UP (ref 0.2–1)
BILIRUB SERPL-MCNC: 0.4 MG/DL — SIGNIFICANT CHANGE UP (ref 0.2–1.2)
BILIRUB SERPL-MCNC: 0.5 MG/DL — SIGNIFICANT CHANGE UP (ref 0.2–1.2)
BILIRUB SERPL-MCNC: 0.6 MG/DL — SIGNIFICANT CHANGE UP (ref 0.2–1.2)
BLD GP AB SCN SERPL QL: NEGATIVE — SIGNIFICANT CHANGE UP
BUN SERPL-MCNC: 19 MG/DL — SIGNIFICANT CHANGE UP (ref 7–23)
BUN SERPL-MCNC: 19 MG/DL — SIGNIFICANT CHANGE UP (ref 7–23)
BUN SERPL-MCNC: 20 MG/DL — SIGNIFICANT CHANGE UP (ref 7–23)
CALCIUM SERPL-MCNC: 8.2 MG/DL — LOW (ref 8.4–10.5)
CALCIUM SERPL-MCNC: 8.4 MG/DL — SIGNIFICANT CHANGE UP (ref 8.4–10.5)
CALCIUM SERPL-MCNC: 9.5 MG/DL — SIGNIFICANT CHANGE UP (ref 8.4–10.5)
CHLORIDE SERPL-SCNC: 101 MMOL/L — SIGNIFICANT CHANGE UP (ref 96–108)
CHLORIDE SERPL-SCNC: 101 MMOL/L — SIGNIFICANT CHANGE UP (ref 96–108)
CHLORIDE SERPL-SCNC: 104 MMOL/L — SIGNIFICANT CHANGE UP (ref 96–108)
CO2 SERPL-SCNC: 23 MMOL/L — SIGNIFICANT CHANGE UP (ref 22–31)
CO2 SERPL-SCNC: 23 MMOL/L — SIGNIFICANT CHANGE UP (ref 22–31)
CO2 SERPL-SCNC: 27 MMOL/L — SIGNIFICANT CHANGE UP (ref 22–31)
CREAT SERPL-MCNC: 0.86 MG/DL — SIGNIFICANT CHANGE UP (ref 0.5–1.3)
CREAT SERPL-MCNC: 0.86 MG/DL — SIGNIFICANT CHANGE UP (ref 0.5–1.3)
CREAT SERPL-MCNC: 0.97 MG/DL — SIGNIFICANT CHANGE UP (ref 0.5–1.3)
EOSINOPHIL NFR BLD AUTO: 0.8 % — SIGNIFICANT CHANGE UP (ref 0–6)
GAS PNL BLDA: SIGNIFICANT CHANGE UP
GAS PNL BLDA: SIGNIFICANT CHANGE UP
GLUCOSE BLDC GLUCOMTR-MCNC: 138 MG/DL — HIGH (ref 70–99)
GLUCOSE BLDC GLUCOMTR-MCNC: 148 MG/DL — HIGH (ref 70–99)
GLUCOSE SERPL-MCNC: 120 MG/DL — HIGH (ref 70–99)
GLUCOSE SERPL-MCNC: 141 MG/DL — HIGH (ref 70–99)
GLUCOSE SERPL-MCNC: 151 MG/DL — HIGH (ref 70–99)
HCT VFR BLD CALC: 30 % — LOW (ref 34.5–45)
HCT VFR BLD CALC: 31.6 % — LOW (ref 34.5–45)
HCT VFR BLD CALC: 35.8 % — SIGNIFICANT CHANGE UP (ref 34.5–45)
HGB BLD-MCNC: 10 G/DL — LOW (ref 11.5–15.5)
HGB BLD-MCNC: 10.9 G/DL — LOW (ref 11.5–15.5)
HGB BLD-MCNC: 12.3 G/DL — SIGNIFICANT CHANGE UP (ref 11.5–15.5)
INR BLD: 1.04 — SIGNIFICANT CHANGE UP (ref 0.88–1.16)
INR BLD: 1.11 — SIGNIFICANT CHANGE UP (ref 0.88–1.16)
LYMPHOCYTES # BLD AUTO: 10.3 % — LOW (ref 13–44)
MAGNESIUM SERPL-MCNC: 1.8 MG/DL — SIGNIFICANT CHANGE UP (ref 1.6–2.6)
MCHC RBC-ENTMCNC: 31.3 PG — SIGNIFICANT CHANGE UP (ref 27–34)
MCHC RBC-ENTMCNC: 32 PG — SIGNIFICANT CHANGE UP (ref 27–34)
MCHC RBC-ENTMCNC: 32.3 PG — SIGNIFICANT CHANGE UP (ref 27–34)
MCHC RBC-ENTMCNC: 33.3 G/DL — SIGNIFICANT CHANGE UP (ref 32–36)
MCHC RBC-ENTMCNC: 34.4 G/DL — SIGNIFICANT CHANGE UP (ref 32–36)
MCHC RBC-ENTMCNC: 34.5 G/DL — SIGNIFICANT CHANGE UP (ref 32–36)
MCV RBC AUTO: 92.7 FL — SIGNIFICANT CHANGE UP (ref 80–100)
MCV RBC AUTO: 93.8 FL — SIGNIFICANT CHANGE UP (ref 80–100)
MCV RBC AUTO: 94 FL — SIGNIFICANT CHANGE UP (ref 80–100)
MONOCYTES NFR BLD AUTO: 8.4 % — SIGNIFICANT CHANGE UP (ref 2–14)
NEUTROPHILS NFR BLD AUTO: 80.4 % — HIGH (ref 43–77)
NT-PROBNP SERPL-SCNC: 2045 PG/ML — HIGH (ref 0–300)
PLATELET # BLD AUTO: 107 K/UL — LOW (ref 150–400)
PLATELET # BLD AUTO: 119 K/UL — LOW (ref 150–400)
PLATELET # BLD AUTO: 187 K/UL — SIGNIFICANT CHANGE UP (ref 150–400)
POTASSIUM SERPL-MCNC: 3.6 MMOL/L — SIGNIFICANT CHANGE UP (ref 3.5–5.3)
POTASSIUM SERPL-MCNC: 3.8 MMOL/L — SIGNIFICANT CHANGE UP (ref 3.5–5.3)
POTASSIUM SERPL-MCNC: 4.2 MMOL/L — SIGNIFICANT CHANGE UP (ref 3.5–5.3)
POTASSIUM SERPL-SCNC: 3.6 MMOL/L — SIGNIFICANT CHANGE UP (ref 3.5–5.3)
POTASSIUM SERPL-SCNC: 3.8 MMOL/L — SIGNIFICANT CHANGE UP (ref 3.5–5.3)
POTASSIUM SERPL-SCNC: 4.2 MMOL/L — SIGNIFICANT CHANGE UP (ref 3.5–5.3)
PROT SERPL-MCNC: 5.1 G/DL — LOW (ref 6–8.3)
PROT SERPL-MCNC: 5.6 G/DL — LOW (ref 6–8.3)
PROT SERPL-MCNC: 7.3 G/DL — SIGNIFICANT CHANGE UP (ref 6–8.3)
PROTHROM AB SERPL-ACNC: 11.6 SEC — SIGNIFICANT CHANGE UP (ref 9.8–12.7)
PROTHROM AB SERPL-ACNC: 12.4 SEC — SIGNIFICANT CHANGE UP (ref 9.8–12.7)
RBC # BLD: 3.2 M/UL — LOW (ref 3.8–5.2)
RBC # BLD: 3.41 M/UL — LOW (ref 3.8–5.2)
RBC # BLD: 3.81 M/UL — SIGNIFICANT CHANGE UP (ref 3.8–5.2)
RBC # FLD: 14.5 % — SIGNIFICANT CHANGE UP (ref 10.3–16.9)
RBC # FLD: 15.1 % — SIGNIFICANT CHANGE UP (ref 10.3–16.9)
RBC # FLD: 15.8 % — SIGNIFICANT CHANGE UP (ref 10.3–16.9)
RH IG SCN BLD-IMP: POSITIVE — SIGNIFICANT CHANGE UP
SODIUM SERPL-SCNC: 137 MMOL/L — SIGNIFICANT CHANGE UP (ref 135–145)
SODIUM SERPL-SCNC: 137 MMOL/L — SIGNIFICANT CHANGE UP (ref 135–145)
SODIUM SERPL-SCNC: 140 MMOL/L — SIGNIFICANT CHANGE UP (ref 135–145)
TSH SERPL-MCNC: 1.18 UIU/ML — SIGNIFICANT CHANGE UP (ref 0.35–4.94)
WBC # BLD: 5.8 K/UL — SIGNIFICANT CHANGE UP (ref 3.8–10.5)
WBC # BLD: 8.7 K/UL — SIGNIFICANT CHANGE UP (ref 3.8–10.5)
WBC # BLD: 8.8 K/UL — SIGNIFICANT CHANGE UP (ref 3.8–10.5)
WBC # FLD AUTO: 5.8 K/UL — SIGNIFICANT CHANGE UP (ref 3.8–10.5)
WBC # FLD AUTO: 8.7 K/UL — SIGNIFICANT CHANGE UP (ref 3.8–10.5)
WBC # FLD AUTO: 8.8 K/UL — SIGNIFICANT CHANGE UP (ref 3.8–10.5)

## 2018-06-12 PROCEDURE — 33363 REPLACE AORTIC VALVE OPEN: CPT | Mod: 62,Q0

## 2018-06-12 PROCEDURE — 93312 ECHO TRANSESOPHAGEAL: CPT | Mod: 26

## 2018-06-12 PROCEDURE — 99233 SBSQ HOSP IP/OBS HIGH 50: CPT | Mod: 25

## 2018-06-12 PROCEDURE — 93010 ELECTROCARDIOGRAM REPORT: CPT

## 2018-06-12 PROCEDURE — 93325 DOPPLER ECHO COLOR FLOW MAPG: CPT | Mod: 26

## 2018-06-12 PROCEDURE — 93320 DOPPLER ECHO COMPLETE: CPT | Mod: 26

## 2018-06-12 PROCEDURE — 99291 CRITICAL CARE FIRST HOUR: CPT

## 2018-06-12 PROCEDURE — 71045 X-RAY EXAM CHEST 1 VIEW: CPT | Mod: 26

## 2018-06-12 RX ORDER — DEXTROSE 50 % IN WATER 50 %
12.5 SYRINGE (ML) INTRAVENOUS ONCE
Qty: 0 | Refills: 0 | Status: DISCONTINUED | OUTPATIENT
Start: 2018-06-12 | End: 2018-06-15

## 2018-06-12 RX ORDER — GLUCAGON INJECTION, SOLUTION 0.5 MG/.1ML
1 INJECTION, SOLUTION SUBCUTANEOUS ONCE
Qty: 0 | Refills: 0 | Status: DISCONTINUED | OUTPATIENT
Start: 2018-06-12 | End: 2018-06-15

## 2018-06-12 RX ORDER — ASPIRIN/CALCIUM CARB/MAGNESIUM 324 MG
81 TABLET ORAL DAILY
Qty: 0 | Refills: 0 | Status: DISCONTINUED | OUTPATIENT
Start: 2018-06-12 | End: 2018-06-15

## 2018-06-12 RX ORDER — ONDANSETRON 8 MG/1
4 TABLET, FILM COATED ORAL ONCE
Qty: 0 | Refills: 0 | Status: COMPLETED | OUTPATIENT
Start: 2018-06-12 | End: 2018-06-12

## 2018-06-12 RX ORDER — ALPRAZOLAM 0.25 MG
0.5 TABLET ORAL EVERY 8 HOURS
Qty: 0 | Refills: 0 | Status: DISCONTINUED | OUTPATIENT
Start: 2018-06-12 | End: 2018-06-15

## 2018-06-12 RX ORDER — DEXTROSE 50 % IN WATER 50 %
25 SYRINGE (ML) INTRAVENOUS ONCE
Qty: 0 | Refills: 0 | Status: DISCONTINUED | OUTPATIENT
Start: 2018-06-12 | End: 2018-06-15

## 2018-06-12 RX ORDER — SENNA PLUS 8.6 MG/1
2 TABLET ORAL AT BEDTIME
Qty: 0 | Refills: 0 | Status: DISCONTINUED | OUTPATIENT
Start: 2018-06-12 | End: 2018-06-15

## 2018-06-12 RX ORDER — DOCUSATE SODIUM 100 MG
100 CAPSULE ORAL THREE TIMES A DAY
Qty: 0 | Refills: 0 | Status: DISCONTINUED | OUTPATIENT
Start: 2018-06-12 | End: 2018-06-15

## 2018-06-12 RX ORDER — MEPERIDINE HYDROCHLORIDE 50 MG/ML
25 INJECTION INTRAMUSCULAR; INTRAVENOUS; SUBCUTANEOUS ONCE
Qty: 0 | Refills: 0 | Status: DISCONTINUED | OUTPATIENT
Start: 2018-06-12 | End: 2018-06-12

## 2018-06-12 RX ORDER — SODIUM CHLORIDE 9 MG/ML
1000 INJECTION INTRAMUSCULAR; INTRAVENOUS; SUBCUTANEOUS
Qty: 0 | Refills: 0 | Status: DISCONTINUED | OUTPATIENT
Start: 2018-06-12 | End: 2018-06-13

## 2018-06-12 RX ORDER — CEFAZOLIN SODIUM 1 G
2000 VIAL (EA) INJECTION EVERY 12 HOURS
Qty: 0 | Refills: 0 | Status: DISCONTINUED | OUTPATIENT
Start: 2018-06-12 | End: 2018-06-14

## 2018-06-12 RX ORDER — CLOPIDOGREL BISULFATE 75 MG/1
75 TABLET, FILM COATED ORAL DAILY
Qty: 0 | Refills: 0 | Status: DISCONTINUED | OUTPATIENT
Start: 2018-06-13 | End: 2018-06-15

## 2018-06-12 RX ORDER — ASPIRIN/CALCIUM CARB/MAGNESIUM 324 MG
81 TABLET ORAL ONCE
Qty: 0 | Refills: 0 | Status: COMPLETED | OUTPATIENT
Start: 2018-06-12 | End: 2018-06-12

## 2018-06-12 RX ORDER — DEXTROSE 50 % IN WATER 50 %
15 SYRINGE (ML) INTRAVENOUS ONCE
Qty: 0 | Refills: 0 | Status: DISCONTINUED | OUTPATIENT
Start: 2018-06-12 | End: 2018-06-15

## 2018-06-12 RX ORDER — FAMOTIDINE 10 MG/ML
20 INJECTION INTRAVENOUS DAILY
Qty: 0 | Refills: 0 | Status: DISCONTINUED | OUTPATIENT
Start: 2018-06-12 | End: 2018-06-13

## 2018-06-12 RX ORDER — CHOLECALCIFEROL (VITAMIN D3) 125 MCG
1 CAPSULE ORAL
Qty: 0 | Refills: 0 | COMMUNITY

## 2018-06-12 RX ORDER — HEPARIN SODIUM 5000 [USP'U]/ML
5000 INJECTION INTRAVENOUS; SUBCUTANEOUS EVERY 8 HOURS
Qty: 0 | Refills: 0 | Status: DISCONTINUED | OUTPATIENT
Start: 2018-06-12 | End: 2018-06-15

## 2018-06-12 RX ORDER — SIMVASTATIN 20 MG/1
40 TABLET, FILM COATED ORAL AT BEDTIME
Qty: 0 | Refills: 0 | Status: DISCONTINUED | OUTPATIENT
Start: 2018-06-12 | End: 2018-06-15

## 2018-06-12 RX ORDER — HYDRALAZINE HCL 50 MG
5 TABLET ORAL ONCE
Qty: 0 | Refills: 0 | Status: COMPLETED | OUTPATIENT
Start: 2018-06-12 | End: 2018-06-12

## 2018-06-12 RX ORDER — LIDOCAINE 4 G/100G
1 CREAM TOPICAL DAILY
Qty: 0 | Refills: 0 | Status: DISCONTINUED | OUTPATIENT
Start: 2018-06-12 | End: 2018-06-15

## 2018-06-12 RX ORDER — CHLORHEXIDINE GLUCONATE 213 G/1000ML
5 SOLUTION TOPICAL EVERY 4 HOURS
Qty: 0 | Refills: 0 | Status: DISCONTINUED | OUTPATIENT
Start: 2018-06-12 | End: 2018-06-13

## 2018-06-12 RX ORDER — FENTANYL CITRATE 50 UG/ML
12.5 INJECTION INTRAVENOUS ONCE
Qty: 0 | Refills: 0 | Status: DISCONTINUED | OUTPATIENT
Start: 2018-06-12 | End: 2018-06-12

## 2018-06-12 RX ORDER — CLOPIDOGREL BISULFATE 75 MG/1
300 TABLET, FILM COATED ORAL ONCE
Qty: 0 | Refills: 0 | Status: COMPLETED | OUTPATIENT
Start: 2018-06-12 | End: 2018-06-12

## 2018-06-12 RX ORDER — AMLODIPINE BESYLATE 2.5 MG/1
5 TABLET ORAL DAILY
Qty: 0 | Refills: 0 | Status: DISCONTINUED | OUTPATIENT
Start: 2018-06-12 | End: 2018-06-13

## 2018-06-12 RX ORDER — SODIUM CHLORIDE 9 MG/ML
500 INJECTION, SOLUTION INTRAVENOUS ONCE
Qty: 0 | Refills: 0 | Status: DISCONTINUED | OUTPATIENT
Start: 2018-06-12 | End: 2018-06-15

## 2018-06-12 RX ORDER — ACETAMINOPHEN 500 MG
1000 TABLET ORAL ONCE
Qty: 0 | Refills: 0 | Status: COMPLETED | OUTPATIENT
Start: 2018-06-12 | End: 2018-06-12

## 2018-06-12 RX ORDER — INSULIN LISPRO 100/ML
VIAL (ML) SUBCUTANEOUS EVERY 6 HOURS
Qty: 0 | Refills: 0 | Status: DISCONTINUED | OUTPATIENT
Start: 2018-06-12 | End: 2018-06-15

## 2018-06-12 RX ORDER — SODIUM CHLORIDE 9 MG/ML
1000 INJECTION, SOLUTION INTRAVENOUS
Qty: 0 | Refills: 0 | Status: DISCONTINUED | OUTPATIENT
Start: 2018-06-12 | End: 2018-06-15

## 2018-06-12 RX ADMIN — Medication 1 DROP(S): at 19:25

## 2018-06-12 RX ADMIN — HEPARIN SODIUM 5000 UNIT(S): 5000 INJECTION INTRAVENOUS; SUBCUTANEOUS at 22:05

## 2018-06-12 RX ADMIN — CLOPIDOGREL BISULFATE 300 MILLIGRAM(S): 75 TABLET, FILM COATED ORAL at 22:05

## 2018-06-12 RX ADMIN — ONDANSETRON 4 MILLIGRAM(S): 8 TABLET, FILM COATED ORAL at 16:59

## 2018-06-12 RX ADMIN — Medication 81 MILLIGRAM(S): at 07:27

## 2018-06-12 RX ADMIN — SIMVASTATIN 40 MILLIGRAM(S): 20 TABLET, FILM COATED ORAL at 22:06

## 2018-06-12 RX ADMIN — AMLODIPINE BESYLATE 5 MILLIGRAM(S): 2.5 TABLET ORAL at 18:52

## 2018-06-12 RX ADMIN — Medication 1000 MILLIGRAM(S): at 19:22

## 2018-06-12 RX ADMIN — Medication 100 MILLIGRAM(S): at 16:58

## 2018-06-12 RX ADMIN — Medication 5 MILLIGRAM(S): at 14:54

## 2018-06-12 RX ADMIN — Medication 100 MILLIGRAM(S): at 22:06

## 2018-06-12 RX ADMIN — FENTANYL CITRATE 12.5 MICROGRAM(S): 50 INJECTION INTRAVENOUS at 12:37

## 2018-06-12 RX ADMIN — ONDANSETRON 4 MILLIGRAM(S): 8 TABLET, FILM COATED ORAL at 16:16

## 2018-06-12 RX ADMIN — Medication 5 MILLIGRAM(S): at 16:15

## 2018-06-12 RX ADMIN — Medication 400 MILLIGRAM(S): at 18:52

## 2018-06-12 RX ADMIN — FENTANYL CITRATE 12.5 MICROGRAM(S): 50 INJECTION INTRAVENOUS at 14:17

## 2018-06-12 RX ADMIN — LIDOCAINE 1 PATCH: 4 CREAM TOPICAL at 13:35

## 2018-06-12 RX ADMIN — FENTANYL CITRATE 12.5 MICROGRAM(S): 50 INJECTION INTRAVENOUS at 14:54

## 2018-06-12 RX ADMIN — FENTANYL CITRATE 12.5 MICROGRAM(S): 50 INJECTION INTRAVENOUS at 13:35

## 2018-06-12 RX ADMIN — SENNA PLUS 2 TABLET(S): 8.6 TABLET ORAL at 22:06

## 2018-06-12 NOTE — H&P ADULT - HISTORY OF PRESENT ILLNESS
88 y/o female with ahistory of HTN, HLD, history of Colon CA s/p colon resection (2001, no radiation/no chemotherapy), CAD (known RCA ), chronic diastolic heart failure with severe aortic stenosis who rpesents for follow up after testing.    The patient underwent an echo on 4/19/18 that showed severe AS with MG of 44mmHg.  Subsequent cardiac catheterization on 4/19/18 showed 1V CAD with an occluded RCA.  The patient was evaluated by both Dr. Cobos and Dr. Magaña from [facility] who deemed her high risk for surgical AVR given her age, comorbidities, and frailty.    Since her last visit, the patient states her symptoms have remained the same.  She beceomes SOb with minimal activiy such as dressing and walking to the bathroom.  IN addition, she experiences mild chest pressure when SOB.  THe patient denies orthopnea, PND, dizziness, syncope, LE edema, and palpitations.    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  She lives alone in an apartment and continues to be independent in all her ADL's.  Her closest family relative is a nephew who lives in [place]. 86 y/o female with ahistory of HTN, HLD, history of Colon CA s/p colon resection (2001, no radiation/no chemotherapy), CAD (known RCA ), chronic diastolic heart failure with severe aortic stenosis who presents for TAVR.    The patient underwent an echo on 4/19/18 that showed severe AS with MG of 44mmHg.  Subsequent cardiac catheterization on 4/19/18 showed 1V CAD with an occluded RCA.  The patient was evaluated by both Dr. Cobos and Dr. Magaña from [facility] who deemed her high risk for surgical AVR given her age, comorbidities, and frailty.    Since her last visit, the patient states her symptoms have remained the same.  She beceomes SOb with minimal activiy such as dressing and walking to the bathroom.  IN addition, she experiences mild chest pressure when SOB.  THe patient denies orthopnea, PND, dizziness, syncope, LE edema, and palpitations.    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  She lives alone in an apartment and continues to be independent in all her ADL's.  Her closest family relative is a nephew who lives in [place].

## 2018-06-12 NOTE — H&P ADULT - ASSESSMENT
A/P: 86 y/o with severe AS  - TAVR A/P: 86 y/o female with ahistory of HTN, HLD, history of Colon CA s/p colon resection (2001, no radiation/no chemotherapy), CAD (known RCA ), chronic diastolic heart failure with severe aortic stenosis who presents for TAVR.  NYHA Class III.  All TAVR studies reviewed by Dr. Moore; she has severe aortic stenosis and is an appropriate candidate for transfemoral TAVR with Core Valve.  The procedure including risks and benefits, was discussed with the patient.     - repeat labs including T&S, CBC, CMP, Coags this AM  - PRBC/FFp/Plt on hold for OR  - NPO since last night  - took ASA 81 this AM  - patient consented for TAVR and related procedures A/P: 88 y/o female with ahistory of HTN, HLD, history of Colon CA s/p colon resection (2001, no radiation/no chemotherapy), CAD (known RCA ), chronic diastolic heart failure with severe aortic stenosis who presents for TAVR.  NYHA Class III.  All TAVR studies reviewed by Dr. Moore; she has severe aortic stenosis and is an appropriate candidate for transinnominate TAVR with Core Valve.  The procedure including risks and benefits, was discussed with the patient.     - repeat labs including T&S, CBC, CMP, Coags this AM  - PRBC/FFp/Plt on hold for OR  - NPO since last night  - took ASA 81 this AM  - patient consented for TAVR and related procedures

## 2018-06-12 NOTE — PROGRESS NOTE ADULT - SUBJECTIVE AND OBJECTIVE BOX
Post operative note:     Operation / Date: 6/12/18 Transfemoral TAVR (core valve)     SUBJECTIVE ASSESSMENT:  Patient seen this afternoon at bedside, doing well and not offering any complain        Vital Signs Last 24 Hrs  T(C): 35.8 (12 Jun 2018 12:15), Max: 36.5 (11 Jun 2018 13:51)  T(F): 96.5 (12 Jun 2018 12:15), Max: 97.7 (11 Jun 2018 13:51)  HR: 64 (12 Jun 2018 13:00) (58 - 73)  BP: 185/81 (11 Jun 2018 13:51) (185/81 - 185/81)  BP(mean): --  RR: 16 (12 Jun 2018 13:00) (16 - 18)  SpO2: 98% (12 Jun 2018 13:00) (96% - 98%)  I&O's Detail    12 Jun 2018 07:01  -  12 Jun 2018 13:12  --------------------------------------------------------  IN:    sodium chloride 0.9%.: 20 mL  Total IN: 20 mL    OUT:    Indwelling Catheter - Urethral: 50 mL  Total OUT: 50 mL    Total NET: -30 mL          CHEST TUBE:  Yes/No. AIR LEAKS: Yes/No. Suction / H2O SEAL.   GERBER DRAIN:  Yes/No.  EPICARDIAL WIRES: Yes/No.  TIE DOWNS: Yes/No.  BALDWIN: Yes/No.    PHYSICAL EXAM:    General:     Neurological:    Cardiovascular:    Respiratory:    Gastrointestinal:    Extremities:    Vascular:    Incision Sites:    LABS:                        10.0   8.7   )-----------( 107      ( 12 Jun 2018 12:30 )             30.0       COUMADIN:  Yes/No. REASON: .    PT/INR - ( 12 Jun 2018 06:42 )   PT: 11.6 sec;   INR: 1.04          PTT - ( 12 Jun 2018 12:30 )  PTT:32.9 sec    06-12    137  |  104  |  19  ----------------------------<  151<H>  4.2   |  23  |  0.86    Ca    8.2<L>      12 Jun 2018 12:30  Mg     1.8     06-12    TPro  5.1<L>  /  Alb  3.0<L>  /  TBili  0.6  /  DBili  x   /  AST  35  /  ALT  18  /  AlkPhos  39<L>  06-12          MEDICATIONS  (STANDING):  aspirin enteric coated 81 milliGRAM(s) Oral daily  ceFAZolin   IVPB 2000 milliGRAM(s) IV Intermittent every 8 hours  chlorhexidine 0.12% Liquid 5 milliLiter(s) Swish and Spit every 4 hours  famotidine Injectable 20 milliGRAM(s) IV Push every 12 hours  heparin  Injectable 5000 Unit(s) SubCutaneous every 8 hours  simvastatin 40 milliGRAM(s) Oral at bedtime  sodium chloride 0.9%. 1000 milliLiter(s) (10 mL/Hr) IV Continuous <Continuous>    MEDICATIONS  (PRN):  ALPRAZolam 0.5 milliGRAM(s) Oral every 8 hours PRN anxiety        RADIOLOGY & ADDITIONAL TESTS: Post operative note:     Operation / Date: 6/12/18 Transfemoral TAVR (core valve)     SUBJECTIVE ASSESSMENT:  Patient seen this afternoon at bedside, doing well but complaining of mild neck incisional pain. Denies any chest pain or shortness of breath.     Vital Signs Last 24 Hrs  T(C): 35.8 (12 Jun 2018 12:15), Max: 36.5 (11 Jun 2018 13:51)  T(F): 96.5 (12 Jun 2018 12:15), Max: 97.7 (11 Jun 2018 13:51)  HR: 64 (12 Jun 2018 13:00) (58 - 73)  BP: 185/81 (11 Jun 2018 13:51) (185/81 - 185/81)  BP(mean): --  RR: 16 (12 Jun 2018 13:00) (16 - 18)  SpO2: 98% (12 Jun 2018 13:00) (96% - 98%)  I&O's Detail    12 Jun 2018 07:01  -  12 Jun 2018 13:12  --------------------------------------------------------  IN:    sodium chloride 0.9%.: 20 mL  Total IN: 20 mL    OUT:    Indwelling Catheter - Urethral: 50 mL  Total OUT: 50 mL    Total NET: -30 mL    CHEST TUBE:  No  GERBER DRAIN: No  EPICARDIAL WIRES: TVP via R IJ   TIE DOWNS: No  BALDWIN: Yes     PHYSICAL EXAM:    General: Patient lying comfortably in bed, no acute distress     Neurological: Alert and oriented. No focal neurological deficits. UE and LE strength 5/5 bilaterally     Cardiovascular: S1S2, RRR, no murmurs appreciated on exam     Respiratory: Clear to ausculation bilaterally     Gastrointestinal: Abdomen soft, non tender, non distended     Extremities: Warm and well perfused. No edema or calf tenderness     Vascular: Peripheral pulses 2+ bilaterally     Incision Sites: R groin site C/D/i, no hematoma   R neck incision c/d/i, no drainage or surrounding erythema. No hematoma     LABS:                        10.0   8.7   )-----------( 107      ( 12 Jun 2018 12:30 )             30.0       COUMADIN: No    PT/INR - ( 12 Jun 2018 06:42 )   PT: 11.6 sec;   INR: 1.04          PTT - ( 12 Jun 2018 12:30 )  PTT:32.9 sec    06-12    137  |  104  |  19  ----------------------------<  151<H>  4.2   |  23  |  0.86    Ca    8.2<L>      12 Jun 2018 12:30  Mg     1.8     06-12    TPro  5.1<L>  /  Alb  3.0<L>  /  TBili  0.6  /  DBili  x   /  AST  35  /  ALT  18  /  AlkPhos  39<L>  06-12    MEDICATIONS  (STANDING):  aspirin enteric coated 81 milliGRAM(s) Oral daily  ceFAZolin   IVPB 2000 milliGRAM(s) IV Intermittent every 8 hours  chlorhexidine 0.12% Liquid 5 milliLiter(s) Swish and Spit every 4 hours  famotidine Injectable 20 milliGRAM(s) IV Push every 12 hours  heparin  Injectable 5000 Unit(s) SubCutaneous every 8 hours  simvastatin 40 milliGRAM(s) Oral at bedtime  sodium chloride 0.9%. 1000 milliLiter(s) (10 mL/Hr) IV Continuous <Continuous>    MEDICATIONS  (PRN):  ALPRAZolam 0.5 milliGRAM(s) Oral every 8 hours PRN anxiety    RADIOLOGY & ADDITIONAL TESTS:    A/P: 87 year old female, PMHx HTN, HLD, hx of Colon Ca s/p resection 2001, CAD with known RCA , chronic diastolic CHF and severe AS evaluated by Dr. Cobos and Dr. Magaña as an outpatient and deemed high risk for surgical AVR and referred to Dr. Moore. She presents today 6/12 for elective TAVR procedure. Procedure uncomplicated, transferred to 9L post op.     Neurovascular: No delirium. Pain well controlled with current regimen.  - Continue tylenol PRN     Cardiovascular: Hemodynamically stable. HR controlled.  - Severe AS s/p TAVR. No intraop BBB, TVP to remain in R IJ (core valve). Will continue to monitor rhythm. holding BB for now   - Continue asa 81mg and plavix 75mg daily.     Respiratory: 02 Sat = 98% on 3L NC   - CXR stable   - Continue to wean O2 to maintain SaO2 > 93%   - Encourage C+DB and Use of IS 10x / hr while awake..    GI: Stable.  - Advance diet as tolerated   - Pepcid 20mg for GI ppx   - Bowel regimen     Renal / : Cr 0.86, no active issues   -Monitor renal function.  -Monitor I/O's.    Endocrine: hgab1c 5.6, TSH pending   - Continue insulin sliding scale   - monitor fingersticks     ID: continue periop abx     Prophylaxis:  -DVT prophylaxis with 5000 SubQ Heparin q8h.  -SCD's    Disposition: Admit to  as mini ICU

## 2018-06-12 NOTE — H&P ADULT - NSHPLABSRESULTS_GEN_ALL_CORE
06-12    140  |  101  |  20  ----------------------------<  120<H>  3.6   |  27  |  0.97    Ca    9.5      12 Jun 2018 06:42  Mg     1.8     06-12    TPro  7.3  /  Alb  4.2  /  TBili  0.5  /  DBili  x   /  AST  19  /  ALT  15  /  AlkPhos  52  06-12  CBC Full  -  ( 12 Jun 2018 06:42 )  WBC Count : 5.8 K/uL  Hemoglobin : 12.3 g/dL  Hematocrit : 35.8 %  Platelet Count - Automated : 187 K/uL  Mean Cell Volume : 94.0 fL  Mean Cell Hemoglobin : 32.3 pg  Mean Cell Hemoglobin Concentration : 34.4 g/dL  Auto Neutrophil # : x  Auto Lymphocyte # : x  Auto Monocyte # : x  Auto Eosinophil # : x  Auto Basophil # : x  Auto Neutrophil % : x  Auto Lymphocyte % : x  Auto Monocyte % : x  Auto Eosinophil % : x  Auto Basophil % : x

## 2018-06-12 NOTE — H&P ADULT - NSHPPHYSICALEXAM_GEN_ALL_CORE
Physical Exam  CONSTITUTIONAL:                                                              WNL  NEURO:                                                                       WNL                      EYES:                                                                                WNL  ENMT:                                                                               WNL  CV:                                                                                   WNL  RESPIRATORY:                                                                 WNL  GI:                                                                                     WNL  : BALDWIN + / -                                                                  WNL  MUSKULOSKELETAL:                                                       WNL  SKIN / BREAST:                                                                  WNL Physical Exam  CONSTITUTIONAL: normal appearance and no distress  NECK: +carotid bruit bilaterally  Pulmonary: no respiratory distress, normal respiratory rhythm and effort, no accessory muscle use and lungs were clear to auscultation bilaterally.  Cardiovascular: heart rate and rhythm were normal and no peripheral edema present  Abdomen: normal bowel sounds, soft and non-tender  MSK: normal gait  Skin: no rash  Psych: oriented to person, place, and time

## 2018-06-12 NOTE — H&P ADULT - NSHPREVIEWOFSYSTEMS_GEN_ALL_CORE
Constitutional: feeling fatigued Constitutional: feeling fatigued  Cardiovascular: shortness of breath, dyspnea on exertion and chest pressure, but no chest pain, no extremity edema, no intermittent leg claudication and no palpitations.  Eyes, Respiratory, GI, , MSK, Integumentary, Neuro and Psych are otherwise negative.

## 2018-06-12 NOTE — PROGRESS NOTE ADULT - SUBJECTIVE AND OBJECTIVE BOX
CTICU  CRITICAL  CARE  attending     Hand off received 					   Pertinent clinical, laboratory, radiographic, hemodynamic, echocardiographic, respiratory data, microbiologic data and chart were reviewed and analyzed frequently throughout the course of the day and night  Patient seen and examined with CTS/ SH attending at bedside  Pt is a 87y , Female, HEALTH ISSUES - PROBLEM Dx:      , FAMILY HISTORY:  PAST MEDICAL & SURGICAL HISTORY:  History of colon cancer  Hypercholesteremia  HTN (hypertension)  Aortic stenosis  H/O Spinal surgery: lumbar  History of bunionectomy of both great toes  History of partial colectomy: 2001    Patient is a 87y old  Female who presents with a chief complaint of Aortic Stenosis (12 Jun 2018 07:21)      14 system review was unremarkable  acute changes include acute respiratory failure  Vital signs, hemodynamic and respiratory parameters were reviewed from the bedside nursing flowsheet.  ICU Vital Signs Last 24 Hrs  T(C): 36.6 (12 Jun 2018 17:09), Max: 36.6 (12 Jun 2018 17:09)  T(F): 97.8 (12 Jun 2018 17:09), Max: 97.8 (12 Jun 2018 17:09)  HR: 76 (12 Jun 2018 18:00) (56 - 76)  BP: 165/69 (12 Jun 2018 14:45) (165/69 - 167/72)  BP(mean): 111 (12 Jun 2018 13:30) (111 - 111)  ABP: 164/48 (12 Jun 2018 18:00) (140/46 - 178/58)  ABP(mean): 92 (12 Jun 2018 18:00) (82 - 104)  RR: 16 (12 Jun 2018 18:00) (16 - 18)  SpO2: 98% (12 Jun 2018 18:00) (96% - 100%)    Adult Advanced Hemodynamics Last 24 Hrs  CVP(mm Hg): --  CVP(cm H2O): --  CO: --  CI: --  PA: --  PA(mean): --  PCWP: --  SVR: --  SVRI: --  PVR: --  PVRI: --, ABG - ( 12 Jun 2018 18:10 )  pH, Arterial: 7.42  pH, Blood: x     /  pCO2: 34    /  pO2: 95    / HCO3: 22    / Base Excess: -2.3  /  SaO2: 97                  Intake and output was reviewed and the fluid balance was calculated  Daily     Daily   I&O's Summary    12 Jun 2018 07:01  -  12 Jun 2018 18:48  --------------------------------------------------------  IN: 110 mL / OUT: 330 mL / NET: -220 mL        All lines and drain sites were assessed  Glycemic trend was reviewedCAPILLARY BLOOD GLUCOSE      POCT Blood Glucose.: 148 mg/dL (12 Jun 2018 16:56)    No acute change in mental status  Auscultation of the chest reveals equal bs  Abdomen is soft  Extremities are warm and well perfused  Wounds appear clean and unremarkable  Antibiotics are periop    labs  CBC Full  -  ( 12 Jun 2018 12:30 )  WBC Count : 8.7 K/uL  Hemoglobin : 10.0 g/dL  Hematocrit : 30.0 %  Platelet Count - Automated : 107 K/uL  Mean Cell Volume : 93.8 fL  Mean Cell Hemoglobin : 31.3 pg  Mean Cell Hemoglobin Concentration : 33.3 g/dL  Auto Neutrophil # : x  Auto Lymphocyte # : x  Auto Monocyte # : x  Auto Eosinophil # : x  Auto Basophil # : x  Auto Neutrophil % : 80.4 %  Auto Lymphocyte % : 10.3 %  Auto Monocyte % : 8.4 %  Auto Eosinophil % : 0.8 %  Auto Basophil % : 0.1 %    06-12    137  |  101  |  19  ----------------------------<  141<H>  3.8   |  23  |  0.86    Ca    8.4      12 Jun 2018 18:11  Mg     1.8     06-12    TPro  5.6<L>  /  Alb  3.2<L>  /  TBili  0.4  /  DBili  <0.2  /  AST  38  /  ALT  21  /  AlkPhos  46  06-12    PT/INR - ( 12 Jun 2018 18:11 )   PT: 12.4 sec;   INR: 1.11          PTT - ( 12 Jun 2018 18:11 )  PTT:29.6 sec  The current medications were reviewed   MEDICATIONS  (STANDING):  acetaminophen  IVPB. 1000 milliGRAM(s) IV Intermittent once  artificial  tears Solution 1 Drop(s) Both EYES daily  aspirin enteric coated 81 milliGRAM(s) Oral daily  ceFAZolin   IVPB 2000 milliGRAM(s) IV Intermittent every 12 hours  chlorhexidine 0.12% Liquid 5 milliLiter(s) Swish and Spit every 4 hours  dextrose 5%. 1000 milliLiter(s) (50 mL/Hr) IV Continuous <Continuous>  dextrose 50% Injectable 12.5 Gram(s) IV Push once  dextrose 50% Injectable 25 Gram(s) IV Push once  dextrose 50% Injectable 25 Gram(s) IV Push once  docusate sodium 100 milliGRAM(s) Oral three times a day  famotidine Injectable 20 milliGRAM(s) IV Push daily  heparin  Injectable 5000 Unit(s) SubCutaneous every 8 hours  insulin lispro (HumaLOG) corrective regimen sliding scale   SubCutaneous every 6 hours  lactated ringers Bolus 500 milliLiter(s) IV Bolus once  lidocaine   Patch 1 Patch Transdermal daily  senna 2 Tablet(s) Oral at bedtime  simvastatin 40 milliGRAM(s) Oral at bedtime  sodium chloride 0.9%. 1000 milliLiter(s) (10 mL/Hr) IV Continuous <Continuous>    MEDICATIONS  (PRN):  ALPRAZolam 0.5 milliGRAM(s) Oral every 8 hours PRN anxiety  dextrose 40% Gel 15 Gram(s) Oral once PRN Blood Glucose LESS THAN 70 milliGRAM(s)/deciliter  glucagon  Injectable 1 milliGRAM(s) IntraMuscular once PRN Glucose LESS THAN 70 milligrams/deciliter       PROBLEM LIST/ ASSESSMENT:  HEALTH ISSUES - PROBLEM Dx:      ,   Patient is a 87y old  Female who presents with a chief complaint of Aortic Stenosis (12 Jun 2018 07:21)     s/p   acute changes include acute respiratory failure    My plan includes :  close hemodynamic, ventilatory and drain monitoring and management per post op routine    Monitor for arrhythmias and monitor parameters for organ perfusion  monitor neurologic status  Head of the bed should remain elevated to 45 deg .   chest PT and IS will be encouraged  monitor adequacy of oxygenation and ventilation and attempt to wean oxygen  Nutritional goals will be met using po eventually , ensure adequate caloric intake and montior the same  Stress ulcer and VTE prophylaxis will be achieved    Glycemic control is satisfactory  Electrolytes have been repleted as necessary and wound care has been carried out. Pain control has been achieved.   agressive physical therapy and early mobility and ambulation goals will be met   The family was updated about the course and plan  CRITICAL CARE TIME SPENT in evaluation and management, reassessments, review and interpretation of labs and x-rays, ventilator and hemodynamic management, formulating a plan and coordinating care: ___90____ MIN.  Time does not include procedural time.  CTICU ATTENDING     					    James Benjamin MD CTICU  CRITICAL  CARE  attending     Hand off received 					   Pertinent clinical, laboratory, radiographic, hemodynamic, echocardiographic, respiratory data, microbiologic data and chart were reviewed and analyzed frequently throughout the course of the day and night  Patient seen and examined with CTS/ SH attending at bedside    Pt is a 87y , Female, s/p direct access TAVR (via the innominate artery); core valve    post procedure:    pt extubated in the hybrid room  Right IJ TVP as back up  narrow qrs complex  hemodynamically stable      , FAMILY HISTORY:  PAST MEDICAL & SURGICAL HISTORY:  History of colon cancer  Hypercholesteremia  HTN (hypertension)  Aortic stenosis  H/O Spinal surgery: lumbar  History of bunionectomy of both great toes  History of partial colectomy: 2001    Patient is a 87y old  Female who presents with a chief complaint of Aortic Stenosis (12 Jun 2018 07:21)      14 system review was unremarkable  acute changes include acute respiratory failure  Vital signs, hemodynamic and respiratory parameters were reviewed from the bedside nursing flowsheet.  ICU Vital Signs Last 24 Hrs  T(C): 36.6 (12 Jun 2018 17:09), Max: 36.6 (12 Jun 2018 17:09)  T(F): 97.8 (12 Jun 2018 17:09), Max: 97.8 (12 Jun 2018 17:09)  HR: 76 (12 Jun 2018 18:00) (56 - 76)  BP: 165/69 (12 Jun 2018 14:45) (165/69 - 167/72)  BP(mean): 111 (12 Jun 2018 13:30) (111 - 111)  ABP: 164/48 (12 Jun 2018 18:00) (140/46 - 178/58)  ABP(mean): 92 (12 Jun 2018 18:00) (82 - 104)  RR: 16 (12 Jun 2018 18:00) (16 - 18)  SpO2: 98% (12 Jun 2018 18:00) (96% - 100%)    Adult Advanced Hemodynamics Last 24 Hrs  CVP(mm Hg): --  CVP(cm H2O): --  CO: --  CI: --  PA: --  PA(mean): --  PCWP: --  SVR: --  SVRI: --  PVR: --  PVRI: --, ABG - ( 12 Jun 2018 18:10 )  pH, Arterial: 7.42  pH, Blood: x     /  pCO2: 34    /  pO2: 95    / HCO3: 22    / Base Excess: -2.3  /  SaO2: 97                  Intake and output was reviewed and the fluid balance was calculated  Daily     Daily   I&O's Summary    12 Jun 2018 07:01  -  12 Jun 2018 18:48  --------------------------------------------------------  IN: 110 mL / OUT: 330 mL / NET: -220 mL        All lines and drain sites were assessed  Glycemic [facility]      POCT Blood Glucose.: 148 mg/dL (12 Jun 2018 16:56)    No acute change in mental status  Auscultation of the chest reveals equal bs  Abdomen is soft  Extremities are warm and well perfused  Wounds appear clean and unremarkable  Antibiotics are periop    labs  CBC Full  -  ( 12 Jun 2018 12:30 )  WBC Count : 8.7 K/uL  Hemoglobin : 10.0 g/dL  Hematocrit : 30.0 %  Platelet Count - Automated : 107 K/uL  Mean Cell Volume : 93.8 fL  Mean Cell Hemoglobin : 31.3 pg  Mean Cell Hemoglobin Concentration : 33.3 g/dL  Auto Neutrophil # : x  Auto Lymphocyte # : x  Auto Monocyte # : x  Auto Eosinophil # : x  Auto Basophil # : x  Auto Neutrophil % : 80.4 %  Auto Lymphocyte % : 10.3 %  Auto Monocyte % : 8.4 %  Auto Eosinophil % : 0.8 %  Auto Basophil % : 0.1 %    06-12    137  |  101  |  19  ----------------------------<  141<H>  3.8   |  23  |  0.86    Ca    8.4      12 Jun 2018 18:11  Mg     1.8     06-12    TPro  5.6<L>  /  Alb  3.2<L>  /  TBili  0.4  /  DBili  <0.2  /  AST  38  /  ALT  21  /  AlkPhos  46  06-12    PT/INR - ( 12 Jun 2018 18:11 )   PT: 12.4 sec;   INR: 1.11          PTT - ( 12 Jun 2018 18:11 )  PTT:29.6 sec  The current medications were reviewed   MEDICATIONS  (STANDING):  acetaminophen  IVPB. 1000 milliGRAM(s) IV Intermittent once  artificial  tears Solution 1 Drop(s) Both EYES daily  aspirin enteric coated 81 milliGRAM(s) Oral daily  ceFAZolin   IVPB 2000 milliGRAM(s) IV Intermittent every 12 hours  chlorhexidine 0.12% Liquid 5 milliLiter(s) Swish and Spit every 4 hours  dextrose 5%. 1000 milliLiter(s) (50 mL/Hr) IV Continuous <Continuous>  dextrose 50% Injectable 12.5 Gram(s) IV Push once  dextrose 50% Injectable 25 Gram(s) IV Push once  dextrose 50% Injectable 25 Gram(s) IV Push once  docusate sodium 100 milliGRAM(s) Oral three times a day  famotidine Injectable 20 milliGRAM(s) IV Push daily  heparin  Injectable 5000 Unit(s) SubCutaneous every 8 hours  insulin lispro (HumaLOG) corrective regimen sliding scale   SubCutaneous every 6 hours  lactated ringers Bolus 500 milliLiter(s) IV Bolus once  lidocaine   Patch 1 Patch Transdermal daily  senna 2 Tablet(s) Oral at bedtime  simvastatin 40 milliGRAM(s) Oral at bedtime  sodium chloride 0.9%. 1000 milliLiter(s) (10 mL/Hr) IV Continuous <Continuous>    MEDICATIONS  (PRN):  ALPRAZolam 0.5 milliGRAM(s) Oral every 8 hours PRN anxiety  dextrose 40% Gel 15 Gram(s) Oral once PRN Blood Glucose LESS THAN 70 milliGRAM(s)/deciliter  glucagon  Injectable 1 milliGRAM(s) IntraMuscular once PRN Glucose LESS THAN 70 milligrams/deciliter       PROBLEM LIST/ ASSESSMENT:  HEALTH ISSUES - PROBLEM Dx:  s/p TAVR      ,   Patient is a 87y old  Female who presents with a chief complaint of Aortic Stenosis (12 Jun 2018 07:21)     s/p TAVR      My plan includes :  close hemodynamic, ventilatory and drain monitoring and management per post op routine    Monitor for arrhythmias and monitor parameters for organ perfusion  monitor neurologic status  Head of the bed should remain elevated to 45 deg .   chest PT and IS will be encouraged  monitor adequacy of oxygenation and ventilation and attempt to wean oxygen  Nutritional goals will be met using po eventually , ensure adequate caloric intake and montior the same  Stress ulcer and VTE prophylaxis will be achieved    Glycemic control is satisfactory  Electrolytes have been repleted as necessary and wound care has been carried out. Pain control has been achieved.   agressive physical therapy and early mobility and ambulation goals will be met   The family was updated about the course and plan  CRITICAL CARE TIME SPENT in evaluation and management, reassessments, review and interpretation of labs and x-rays, ventilator and hemodynamic management, formulating a plan and coordinating care: ___50____ MIN.  Time does not include procedural time.  CTICU ATTENDING     					    Beck Laboy MD

## 2018-06-12 NOTE — BRIEF OPERATIVE NOTE - PROCEDURE
<<-----Click on this checkbox to enter Procedure Endovascular transcatheter aortic valve replacement (TAVR)  06/12/2018    Active  DGLASSER

## 2018-06-13 LAB
ALBUMIN SERPL ELPH-MCNC: 3.2 G/DL — LOW (ref 3.3–5)
ALP SERPL-CCNC: 49 U/L — SIGNIFICANT CHANGE UP (ref 40–120)
ALT FLD-CCNC: 16 U/L — SIGNIFICANT CHANGE UP (ref 10–45)
ANION GAP SERPL CALC-SCNC: 12 MMOL/L — SIGNIFICANT CHANGE UP (ref 5–17)
AST SERPL-CCNC: 38 U/L — SIGNIFICANT CHANGE UP (ref 10–40)
BILIRUB SERPL-MCNC: 0.5 MG/DL — SIGNIFICANT CHANGE UP (ref 0.2–1.2)
BUN SERPL-MCNC: 18 MG/DL — SIGNIFICANT CHANGE UP (ref 7–23)
CALCIUM SERPL-MCNC: 8.4 MG/DL — SIGNIFICANT CHANGE UP (ref 8.4–10.5)
CHLORIDE SERPL-SCNC: 103 MMOL/L — SIGNIFICANT CHANGE UP (ref 96–108)
CO2 SERPL-SCNC: 24 MMOL/L — SIGNIFICANT CHANGE UP (ref 22–31)
CREAT SERPL-MCNC: 0.93 MG/DL — SIGNIFICANT CHANGE UP (ref 0.5–1.3)
EOSINOPHIL NFR BLD AUTO: 0.1 % — SIGNIFICANT CHANGE UP (ref 0–6)
GLUCOSE BLDC GLUCOMTR-MCNC: 131 MG/DL — HIGH (ref 70–99)
GLUCOSE BLDC GLUCOMTR-MCNC: 145 MG/DL — HIGH (ref 70–99)
GLUCOSE BLDC GLUCOMTR-MCNC: 151 MG/DL — HIGH (ref 70–99)
GLUCOSE BLDC GLUCOMTR-MCNC: 153 MG/DL — HIGH (ref 70–99)
GLUCOSE SERPL-MCNC: 157 MG/DL — HIGH (ref 70–99)
HCT VFR BLD CALC: 31.1 % — LOW (ref 34.5–45)
HGB BLD-MCNC: 10.5 G/DL — LOW (ref 11.5–15.5)
LYMPHOCYTES # BLD AUTO: 10.3 % — LOW (ref 13–44)
MCHC RBC-ENTMCNC: 31.3 PG — SIGNIFICANT CHANGE UP (ref 27–34)
MCHC RBC-ENTMCNC: 33.8 G/DL — SIGNIFICANT CHANGE UP (ref 32–36)
MCV RBC AUTO: 92.6 FL — SIGNIFICANT CHANGE UP (ref 80–100)
MONOCYTES NFR BLD AUTO: 15.2 % — HIGH (ref 2–14)
NEUTROPHILS NFR BLD AUTO: 74.4 % — SIGNIFICANT CHANGE UP (ref 43–77)
PLATELET # BLD AUTO: 125 K/UL — LOW (ref 150–400)
POTASSIUM SERPL-MCNC: 3.7 MMOL/L — SIGNIFICANT CHANGE UP (ref 3.5–5.3)
POTASSIUM SERPL-SCNC: 3.7 MMOL/L — SIGNIFICANT CHANGE UP (ref 3.5–5.3)
PROT SERPL-MCNC: 5.7 G/DL — LOW (ref 6–8.3)
RBC # BLD: 3.36 M/UL — LOW (ref 3.8–5.2)
RBC # FLD: 15.9 % — SIGNIFICANT CHANGE UP (ref 10.3–16.9)
SODIUM SERPL-SCNC: 139 MMOL/L — SIGNIFICANT CHANGE UP (ref 135–145)
WBC # BLD: 10 K/UL — SIGNIFICANT CHANGE UP (ref 3.8–10.5)
WBC # FLD AUTO: 10 K/UL — SIGNIFICANT CHANGE UP (ref 3.8–10.5)

## 2018-06-13 PROCEDURE — 99233 SBSQ HOSP IP/OBS HIGH 50: CPT

## 2018-06-13 PROCEDURE — 93010 ELECTROCARDIOGRAM REPORT: CPT

## 2018-06-13 PROCEDURE — 93010 ELECTROCARDIOGRAM REPORT: CPT | Mod: 77

## 2018-06-13 PROCEDURE — 71045 X-RAY EXAM CHEST 1 VIEW: CPT | Mod: 26

## 2018-06-13 PROCEDURE — 93306 TTE W/DOPPLER COMPLETE: CPT | Mod: 26

## 2018-06-13 RX ORDER — TRAMADOL HYDROCHLORIDE 50 MG/1
25 TABLET ORAL EVERY 6 HOURS
Qty: 0 | Refills: 0 | Status: DISCONTINUED | OUTPATIENT
Start: 2018-06-13 | End: 2018-06-15

## 2018-06-13 RX ORDER — ONDANSETRON 8 MG/1
4 TABLET, FILM COATED ORAL ONCE
Qty: 0 | Refills: 0 | Status: COMPLETED | OUTPATIENT
Start: 2018-06-13 | End: 2018-06-13

## 2018-06-13 RX ORDER — ACETAMINOPHEN 500 MG
650 TABLET ORAL EVERY 6 HOURS
Qty: 0 | Refills: 0 | Status: DISCONTINUED | OUTPATIENT
Start: 2018-06-13 | End: 2018-06-15

## 2018-06-13 RX ORDER — POTASSIUM CHLORIDE 20 MEQ
40 PACKET (EA) ORAL ONCE
Qty: 0 | Refills: 0 | Status: COMPLETED | OUTPATIENT
Start: 2018-06-13 | End: 2018-06-13

## 2018-06-13 RX ORDER — HYDROCHLOROTHIAZIDE 25 MG
25 TABLET ORAL DAILY
Qty: 0 | Refills: 0 | Status: DISCONTINUED | OUTPATIENT
Start: 2018-06-14 | End: 2018-06-14

## 2018-06-13 RX ORDER — SODIUM CHLORIDE 9 MG/ML
3 INJECTION INTRAMUSCULAR; INTRAVENOUS; SUBCUTANEOUS EVERY 8 HOURS
Qty: 0 | Refills: 0 | Status: DISCONTINUED | OUTPATIENT
Start: 2018-06-13 | End: 2018-06-15

## 2018-06-13 RX ORDER — HYDRALAZINE HCL 50 MG
5 TABLET ORAL ONCE
Qty: 0 | Refills: 0 | Status: COMPLETED | OUTPATIENT
Start: 2018-06-13 | End: 2018-06-13

## 2018-06-13 RX ORDER — FAMOTIDINE 10 MG/ML
20 INJECTION INTRAVENOUS DAILY
Qty: 0 | Refills: 0 | Status: DISCONTINUED | OUTPATIENT
Start: 2018-06-13 | End: 2018-06-15

## 2018-06-13 RX ORDER — FUROSEMIDE 40 MG
20 TABLET ORAL DAILY
Qty: 0 | Refills: 0 | Status: DISCONTINUED | OUTPATIENT
Start: 2018-06-13 | End: 2018-06-14

## 2018-06-13 RX ORDER — POTASSIUM CHLORIDE 20 MEQ
10 PACKET (EA) ORAL DAILY
Qty: 0 | Refills: 0 | Status: DISCONTINUED | OUTPATIENT
Start: 2018-06-13 | End: 2018-06-14

## 2018-06-13 RX ORDER — AMLODIPINE BESYLATE 2.5 MG/1
10 TABLET ORAL DAILY
Qty: 0 | Refills: 0 | Status: DISCONTINUED | OUTPATIENT
Start: 2018-06-13 | End: 2018-06-15

## 2018-06-13 RX ORDER — KETOROLAC TROMETHAMINE 30 MG/ML
15 SYRINGE (ML) INJECTION EVERY 6 HOURS
Qty: 0 | Refills: 0 | Status: DISCONTINUED | OUTPATIENT
Start: 2018-06-13 | End: 2018-06-13

## 2018-06-13 RX ORDER — ACETAMINOPHEN 500 MG
1000 TABLET ORAL ONCE
Qty: 0 | Refills: 0 | Status: COMPLETED | OUTPATIENT
Start: 2018-06-13 | End: 2018-06-13

## 2018-06-13 RX ADMIN — Medication 40 MILLIEQUIVALENT(S): at 07:45

## 2018-06-13 RX ADMIN — Medication 5 MILLIGRAM(S): at 16:00

## 2018-06-13 RX ADMIN — HEPARIN SODIUM 5000 UNIT(S): 5000 INJECTION INTRAVENOUS; SUBCUTANEOUS at 22:10

## 2018-06-13 RX ADMIN — Medication 100 MILLIGRAM(S): at 06:15

## 2018-06-13 RX ADMIN — Medication 100 MILLIGRAM(S): at 12:59

## 2018-06-13 RX ADMIN — TRAMADOL HYDROCHLORIDE 25 MILLIGRAM(S): 50 TABLET ORAL at 13:37

## 2018-06-13 RX ADMIN — LIDOCAINE 1 PATCH: 4 CREAM TOPICAL at 01:00

## 2018-06-13 RX ADMIN — Medication 0.5 MILLIGRAM(S): at 20:17

## 2018-06-13 RX ADMIN — SODIUM CHLORIDE 3 MILLILITER(S): 9 INJECTION INTRAMUSCULAR; INTRAVENOUS; SUBCUTANEOUS at 22:00

## 2018-06-13 RX ADMIN — Medication 81 MILLIGRAM(S): at 12:59

## 2018-06-13 RX ADMIN — SIMVASTATIN 40 MILLIGRAM(S): 20 TABLET, FILM COATED ORAL at 22:11

## 2018-06-13 RX ADMIN — SENNA PLUS 2 TABLET(S): 8.6 TABLET ORAL at 22:11

## 2018-06-13 RX ADMIN — Medication 400 MILLIGRAM(S): at 00:35

## 2018-06-13 RX ADMIN — TRAMADOL HYDROCHLORIDE 25 MILLIGRAM(S): 50 TABLET ORAL at 15:16

## 2018-06-13 RX ADMIN — ONDANSETRON 4 MILLIGRAM(S): 8 TABLET, FILM COATED ORAL at 18:15

## 2018-06-13 RX ADMIN — FAMOTIDINE 20 MILLIGRAM(S): 10 INJECTION INTRAVENOUS at 13:02

## 2018-06-13 RX ADMIN — Medication 650 MILLIGRAM(S): at 06:45

## 2018-06-13 RX ADMIN — Medication 20 MILLIGRAM(S): at 07:45

## 2018-06-13 RX ADMIN — Medication 1000 MILLIGRAM(S): at 01:02

## 2018-06-13 RX ADMIN — Medication 100 MILLIGRAM(S): at 15:53

## 2018-06-13 RX ADMIN — Medication 100 MILLIGRAM(S): at 22:10

## 2018-06-13 RX ADMIN — LIDOCAINE 1 PATCH: 4 CREAM TOPICAL at 13:02

## 2018-06-13 RX ADMIN — Medication 1 DROP(S): at 13:02

## 2018-06-13 RX ADMIN — Medication 100 MILLIGRAM(S): at 06:14

## 2018-06-13 RX ADMIN — AMLODIPINE BESYLATE 10 MILLIGRAM(S): 2.5 TABLET ORAL at 07:59

## 2018-06-13 RX ADMIN — HEPARIN SODIUM 5000 UNIT(S): 5000 INJECTION INTRAVENOUS; SUBCUTANEOUS at 06:14

## 2018-06-13 RX ADMIN — Medication 2: at 07:47

## 2018-06-13 RX ADMIN — Medication 10 MILLIEQUIVALENT(S): at 13:02

## 2018-06-13 RX ADMIN — Medication 2: at 12:00

## 2018-06-13 RX ADMIN — CLOPIDOGREL BISULFATE 75 MILLIGRAM(S): 75 TABLET, FILM COATED ORAL at 13:02

## 2018-06-13 RX ADMIN — HEPARIN SODIUM 5000 UNIT(S): 5000 INJECTION INTRAVENOUS; SUBCUTANEOUS at 13:00

## 2018-06-13 RX ADMIN — Medication 650 MILLIGRAM(S): at 07:58

## 2018-06-13 NOTE — PROGRESS NOTE ADULT - SUBJECTIVE AND OBJECTIVE BOX
Patient discussed on morning rounds with Dr. Moore      Operation / Date: Direct access TAVR (corevalve) on 6/12/18    SUBJECTIVE ASSESSMENT:  87y Female seen at bedside this AM.  She states that she didn't sleep well last night 2/2 incisional pain and general discomfort, feels better sitting up in the chair.  Additionally, she was started on norvasc for BP control and expresses apprehension regarding the medication which was improved after discussion with her regarding the medications.  She Denies HA, AMS, CP, palpitations, SOB, cough, hemoptysis, n/v/d, fever.     Vital Signs Last 24 Hrs  T(C): 36.3 (13 Jun 2018 05:00), Max: 36.6 (12 Jun 2018 17:09)  T(F): 97.4 (13 Jun 2018 05:00), Max: 97.8 (12 Jun 2018 17:09)  HR: 94 (13 Jun 2018 08:00) (56 - 94)  BP: 165/69 (12 Jun 2018 14:45) (165/69 - 167/72)  BP(mean): 111 (12 Jun 2018 13:30) (111 - 111)  RR: 18 (13 Jun 2018 08:00) (14 - 18)  SpO2: 97% (13 Jun 2018 08:00) (95% - 100%)  I&O's Detail    12 Jun 2018 07:01  -  13 Jun 2018 07:00  --------------------------------------------------------  IN:    IV PiggyBack: 50 mL    Oral Fluid: 120 mL    sodium chloride 0.9%.: 180 mL  Total IN: 350 mL    OUT:    Indwelling Catheter - Urethral: 1465 mL  Total OUT: 1465 mL    Total NET: -1115 mL      13 Jun 2018 07:01  -  13 Jun 2018 08:38  --------------------------------------------------------  IN:    sodium chloride 0.9%.: 10 mL  Total IN: 10 mL    OUT:    Indwelling Catheter - Urethral: 35 mL  Total OUT: 35 mL    Total NET: -25 mL    CHEST TUBE:  No.   GERBER DRAIN:  No.  EPICARDIAL WIRES: No- TVP in place.  TIE DOWNS: No.  BALDWIN: Yes.    PHYSICAL EXAM:  General: OOB to chair, no acute distress.   Neurological: AAOx3, no AMS or focal deficits.   Cardiovascular: RRR, S1/S2, no m/r/g.  TVP in place via L IJV access.   Respiratory: No acute distress on RA.  CTA b/l, no w/r/r.   Gastrointestinal: ND, NBS, non-TTP.  Extremities: Warm and well perfused, no calf ttp or edema b/l.    Vascular: Pulses 2+  Incision Sites: R inominate artery access: C/D/I; R groin cath site: C/D/I, no hematoma.     LABS:                        10.5   10.0  )-----------( 125      ( 13 Jun 2018 05:29 )             31.1       COUMADIN:  No.     PT/INR - ( 12 Jun 2018 18:11 )   PT: 12.4 sec;   INR: 1.11          PTT - ( 12 Jun 2018 18:11 )  PTT:29.6 sec    06-13    139  |  103  |  18  ----------------------------<  157<H>  3.7   |  24  |  0.93    Ca    8.4      13 Jun 2018 05:29  Mg     1.8     06-12    TPro  5.7<L>  /  Alb  3.2<L>  /  TBili  0.5  /  DBili  x   /  AST  38  /  ALT  16  /  AlkPhos  49  06-13      MEDICATIONS  (STANDING):  amLODIPine   Tablet 10 milliGRAM(s) Oral daily  artificial  tears Solution 1 Drop(s) Both EYES daily  aspirin enteric coated 81 milliGRAM(s) Oral daily  ceFAZolin   IVPB 2000 milliGRAM(s) IV Intermittent every 12 hours  chlorhexidine 0.12% Liquid 5 milliLiter(s) Swish and Spit every 4 hours  clopidogrel Tablet 75 milliGRAM(s) Oral daily  dextrose 5%. 1000 milliLiter(s) (50 mL/Hr) IV Continuous <Continuous>  dextrose 50% Injectable 12.5 Gram(s) IV Push once  dextrose 50% Injectable 25 Gram(s) IV Push once  dextrose 50% Injectable 25 Gram(s) IV Push once  docusate sodium 100 milliGRAM(s) Oral three times a day  famotidine Injectable 20 milliGRAM(s) IV Push daily  furosemide    Tablet 20 milliGRAM(s) Oral daily  heparin  Injectable 5000 Unit(s) SubCutaneous every 8 hours  insulin lispro (HumaLOG) corrective regimen sliding scale   SubCutaneous every 6 hours  lactated ringers Bolus 500 milliLiter(s) IV Bolus once  lidocaine   Patch 1 Patch Transdermal daily  potassium chloride    Tablet ER 10 milliEquivalent(s) Oral daily  senna 2 Tablet(s) Oral at bedtime  simvastatin 40 milliGRAM(s) Oral at bedtime  sodium chloride 0.9%. 1000 milliLiter(s) (10 mL/Hr) IV Continuous <Continuous>    MEDICATIONS  (PRN):  acetaminophen   Tablet. 650 milliGRAM(s) Oral every 6 hours PRN Mild Pain (1 - 3)  ALPRAZolam 0.5 milliGRAM(s) Oral every 8 hours PRN anxiety  dextrose 40% Gel 15 Gram(s) Oral once PRN Blood Glucose LESS THAN 70 milliGRAM(s)/deciliter  glucagon  Injectable 1 milliGRAM(s) IntraMuscular once PRN Glucose LESS THAN 70 milligrams/deciliter      RADIOLOGY & ADDITIONAL TESTS:  < from: 12 Lead ECG (06.12.18 @ 11:19) >    Ventricular Rate 64 BPM    Atrial Rate 64 BPM    P-R Interval 236 ms    QRS Duration 120 ms    Q-T Interval 492 ms    QTC Calculation(Bezet) 507 ms    P Axis 49 degrees    R Axis -18 degrees    T Axis 131 degrees    Diagnosis Line Sinus rhythm [id number] degree AV block  Left ventricular hypertrophy with QRS widening and repolarization abnormality  Cannot rule out Septal infarct , age undetermined  Inferior infarct , age undetermined  Abnormal ECG    Confirmed by JASON CHAIDEZ MD ([id number]) on 6/12/2018 4:48:34 PM    < end of copied text >

## 2018-06-13 NOTE — PHYSICAL THERAPY INITIAL EVALUATION ADULT - CRITERIA FOR SKILLED THERAPEUTIC INTERVENTIONS
therapy frequency/anticipated equipment needs at discharge/anticipated discharge recommendation/rehab potential/impairments found/functional limitations in following categories

## 2018-06-13 NOTE — PHYSICAL THERAPY INITIAL EVALUATION ADULT - GENERAL OBSERVATIONS, REHAB EVAL
Patient received sitting in chair complaints of pain 8/10 +EKG, TVP, a-line, central line, liz. Left supine in NAD +lines intact, RN Alice aware

## 2018-06-13 NOTE — PROGRESS NOTE ADULT - ASSESSMENT
87 year old female with history of HTN, HLD, Colon Ca s/p resection 2001, CAD with known RCA , chronic HFpEF, EF 75%, and severe AS evaluated by Dr. Cobos and Dr. Magaña as an outpatient and deemed high risk for surgical AVR and referred to Dr. Moore for procedural evaluation.  She completed workup as an outpatient and on 6/12/18 was admitted to [facility] under the care of Dr. Moore where she underwent direct access TAVR (corevalve) via inominate artery. During the procedure, pt developed LBBB which narrowed post-procedure.  She arrived to mini-ICU care post-procedure with TVP in place, no acute events overnight.  Now POD 1.     Neurovascular: No delirium. Pain well controlled with current regimen.  - Continue tylenol and tramadol PRN    Cardiovascular: Hemodynamically stable. HR controlled.  POD 1 s/p direct access corevalve TAVR, EF 75%  -Introperative LBBB, narrow QRS on repeat EKG this AM. TVP remains in place, repeat EKG in PM, will remove if QRS remains narrow.    -C/w ASA, plavix, statin.  Holding BB at this time 2/2 intraop LBBB, will resume as outpatient.    -C/w diuresis, 20mg Lasix QD.  Maintain slightly negative I/O (pt with LVH, careful not to overdiurese).  -BP control with Norvasc.  Pt endorses concern with medication regarding past adverse events (gynecomastia, anxiety, and GI disruption).  Discussed with Dr. Moore, will continue while inpatient and observe for adverse events, will likely remain on short term course.  Discussed with patient who agrees with plan.   -Monitor HR/BP/Tele    Respiratory: Saturates well on RA.   - CXR stable   - Continue to wean O2 to maintain SaO2 > 93%   - Encourage C+DB and Use of IS 10x / hr while awake..    GI: Stable.  - Pepcid 20mg for GI ppx   - Bowel regimen     Renal / : BUN/Cr: 18/0.93; no active issues   -Lasix for diuresis, 10mEq standing K+, continue to replete electrolytes as needed.   -Monitor renal function.  -Monitor I/O's.    Endocrine: A1C: 5.6, TSH: 1.176  - Continue insulin sliding scale   - monitor fingersticks, currently well managed    ID: continue periop abx   -WCC 10.0, afebrile, asymptomatic.     Heme:  -H/H 10.5/31.1  -DVT ppx: HSQ 5000u q8h and SCDs    Disposition: Remain inpatient.  Home tomorrow.

## 2018-06-14 LAB
ANION GAP SERPL CALC-SCNC: 12 MMOL/L — SIGNIFICANT CHANGE UP (ref 5–17)
BUN SERPL-MCNC: 19 MG/DL — SIGNIFICANT CHANGE UP (ref 7–23)
CALCIUM SERPL-MCNC: 9.3 MG/DL — SIGNIFICANT CHANGE UP (ref 8.4–10.5)
CHLORIDE SERPL-SCNC: 101 MMOL/L — SIGNIFICANT CHANGE UP (ref 96–108)
CO2 SERPL-SCNC: 25 MMOL/L — SIGNIFICANT CHANGE UP (ref 22–31)
CREAT SERPL-MCNC: 0.9 MG/DL — SIGNIFICANT CHANGE UP (ref 0.5–1.3)
GLUCOSE BLDC GLUCOMTR-MCNC: 121 MG/DL — HIGH (ref 70–99)
GLUCOSE BLDC GLUCOMTR-MCNC: 154 MG/DL — HIGH (ref 70–99)
GLUCOSE BLDC GLUCOMTR-MCNC: 178 MG/DL — HIGH (ref 70–99)
GLUCOSE BLDC GLUCOMTR-MCNC: 181 MG/DL — HIGH (ref 70–99)
GLUCOSE SERPL-MCNC: 174 MG/DL — HIGH (ref 70–99)
HCT VFR BLD CALC: 32.2 % — LOW (ref 34.5–45)
HGB BLD-MCNC: 10.8 G/DL — LOW (ref 11.5–15.5)
MAGNESIUM SERPL-MCNC: 1.8 MG/DL — SIGNIFICANT CHANGE UP (ref 1.6–2.6)
MCHC RBC-ENTMCNC: 32 PG — SIGNIFICANT CHANGE UP (ref 27–34)
MCHC RBC-ENTMCNC: 33.5 G/DL — SIGNIFICANT CHANGE UP (ref 32–36)
MCV RBC AUTO: 95.3 FL — SIGNIFICANT CHANGE UP (ref 80–100)
PLATELET # BLD AUTO: 121 K/UL — LOW (ref 150–400)
POTASSIUM SERPL-MCNC: 3.6 MMOL/L — SIGNIFICANT CHANGE UP (ref 3.5–5.3)
POTASSIUM SERPL-SCNC: 3.6 MMOL/L — SIGNIFICANT CHANGE UP (ref 3.5–5.3)
RBC # BLD: 3.38 M/UL — LOW (ref 3.8–5.2)
RBC # FLD: 16 % — SIGNIFICANT CHANGE UP (ref 10.3–16.9)
SODIUM SERPL-SCNC: 138 MMOL/L — SIGNIFICANT CHANGE UP (ref 135–145)
WBC # BLD: 9.9 K/UL — SIGNIFICANT CHANGE UP (ref 3.8–10.5)
WBC # FLD AUTO: 9.9 K/UL — SIGNIFICANT CHANGE UP (ref 3.8–10.5)

## 2018-06-14 PROCEDURE — 93010 ELECTROCARDIOGRAM REPORT: CPT | Mod: 76

## 2018-06-14 PROCEDURE — 71045 X-RAY EXAM CHEST 1 VIEW: CPT | Mod: 26

## 2018-06-14 PROCEDURE — 99233 SBSQ HOSP IP/OBS HIGH 50: CPT

## 2018-06-14 RX ORDER — MAGNESIUM OXIDE 400 MG ORAL TABLET 241.3 MG
800 TABLET ORAL ONCE
Qty: 0 | Refills: 0 | Status: COMPLETED | OUTPATIENT
Start: 2018-06-14 | End: 2018-06-14

## 2018-06-14 RX ORDER — POTASSIUM CHLORIDE 20 MEQ
40 PACKET (EA) ORAL ONCE
Qty: 0 | Refills: 0 | Status: COMPLETED | OUTPATIENT
Start: 2018-06-14 | End: 2018-06-14

## 2018-06-14 RX ORDER — METOPROLOL TARTRATE 50 MG
12.5 TABLET ORAL EVERY 12 HOURS
Qty: 0 | Refills: 0 | Status: DISCONTINUED | OUTPATIENT
Start: 2018-06-14 | End: 2018-06-15

## 2018-06-14 RX ADMIN — Medication 100 MILLIGRAM(S): at 12:22

## 2018-06-14 RX ADMIN — SODIUM CHLORIDE 3 MILLILITER(S): 9 INJECTION INTRAMUSCULAR; INTRAVENOUS; SUBCUTANEOUS at 21:21

## 2018-06-14 RX ADMIN — Medication 2: at 08:02

## 2018-06-14 RX ADMIN — SODIUM CHLORIDE 3 MILLILITER(S): 9 INJECTION INTRAMUSCULAR; INTRAVENOUS; SUBCUTANEOUS at 07:53

## 2018-06-14 RX ADMIN — HEPARIN SODIUM 5000 UNIT(S): 5000 INJECTION INTRAVENOUS; SUBCUTANEOUS at 06:06

## 2018-06-14 RX ADMIN — LIDOCAINE 1 PATCH: 4 CREAM TOPICAL at 01:00

## 2018-06-14 RX ADMIN — HEPARIN SODIUM 5000 UNIT(S): 5000 INJECTION INTRAVENOUS; SUBCUTANEOUS at 22:35

## 2018-06-14 RX ADMIN — Medication 100 MILLIGRAM(S): at 06:05

## 2018-06-14 RX ADMIN — SODIUM CHLORIDE 3 MILLILITER(S): 9 INJECTION INTRAMUSCULAR; INTRAVENOUS; SUBCUTANEOUS at 12:38

## 2018-06-14 RX ADMIN — Medication 20 MILLIGRAM(S): at 06:06

## 2018-06-14 RX ADMIN — Medication 81 MILLIGRAM(S): at 12:22

## 2018-06-14 RX ADMIN — MAGNESIUM OXIDE 400 MG ORAL TABLET 800 MILLIGRAM(S): 241.3 TABLET ORAL at 12:23

## 2018-06-14 RX ADMIN — Medication 2: at 12:23

## 2018-06-14 RX ADMIN — Medication 25 MILLIGRAM(S): at 06:08

## 2018-06-14 RX ADMIN — Medication 1 DROP(S): at 12:38

## 2018-06-14 RX ADMIN — FAMOTIDINE 20 MILLIGRAM(S): 10 INJECTION INTRAVENOUS at 12:22

## 2018-06-14 RX ADMIN — HEPARIN SODIUM 5000 UNIT(S): 5000 INJECTION INTRAVENOUS; SUBCUTANEOUS at 12:22

## 2018-06-14 RX ADMIN — LIDOCAINE 1 PATCH: 4 CREAM TOPICAL at 12:38

## 2018-06-14 RX ADMIN — AMLODIPINE BESYLATE 10 MILLIGRAM(S): 2.5 TABLET ORAL at 06:06

## 2018-06-14 RX ADMIN — Medication 650 MILLIGRAM(S): at 22:34

## 2018-06-14 RX ADMIN — Medication 40 MILLIEQUIVALENT(S): at 12:22

## 2018-06-14 RX ADMIN — Medication 100 MILLIGRAM(S): at 22:35

## 2018-06-14 RX ADMIN — CLOPIDOGREL BISULFATE 75 MILLIGRAM(S): 75 TABLET, FILM COATED ORAL at 12:22

## 2018-06-14 RX ADMIN — Medication 100 MILLIGRAM(S): at 06:06

## 2018-06-14 RX ADMIN — Medication 650 MILLIGRAM(S): at 23:30

## 2018-06-14 RX ADMIN — SIMVASTATIN 40 MILLIGRAM(S): 20 TABLET, FILM COATED ORAL at 22:34

## 2018-06-14 RX ADMIN — Medication 12.5 MILLIGRAM(S): at 17:50

## 2018-06-14 RX ADMIN — Medication 12.5 MILLIGRAM(S): at 12:23

## 2018-06-14 NOTE — PROGRESS NOTE ADULT - SUBJECTIVE AND OBJECTIVE BOX
Patient discussed on morning rounds with Dr. Moore      Operation / Date: Direct access TAVR (corevalve) on 6/12/18    SUBJECTIVE ASSESSMENT:  No current complaints.  Felt nausea this morning but did not vomit and it resolved spontaneously.  She did tolerate breakfast this morning.  Overall she does feel better and feels she will be ready to go home tomorrow.  Denies HA, dizziness, CP, SOB, palpitaitons, V/D/C, leg swelling/calf pain.      Vital Signs Last 24 Hrs  T(C): 36.6 (14 Jun 2018 09:46), Max: 37.4 (13 Jun 2018 21:34)  T(F): 97.8 (14 Jun 2018 09:46), Max: 99.4 (13 Jun 2018 21:34)  HR: 94 (14 Jun 2018 09:00) (94 - 118)  BP: 132/49 (14 Jun 2018 09:00) (124/54 - 166/64)  BP(mean): 72 (14 Jun 2018 09:00) (72 - 107)  RR: 16 (14 Jun 2018 09:00) (14 - 20)  SpO2: 98% (14 Jun 2018 09:00) (93% - 98%)  I&O's Detail    13 Jun 2018 07:01  -  14 Jun 2018 07:00  --------------------------------------------------------  IN:    Oral Fluid: 120 mL    sodium chloride 0.9%: 20 mL  Total IN: 140 mL    OUT:    Indwelling Catheter - Urethral: 430 mL    Voided: 200 mL  Total OUT: 630 mL    Total NET: -490 mL      14 Jun 2018 07:01  -  14 Jun 2018 12:17  --------------------------------------------------------  IN:  Total IN: 0 mL    OUT:    Voided: 250 mL  Total OUT: 250 mL    Total NET: -250 mL    CHEST TUBE:  No.   GERBER DRAIN:  No.  EPICARDIAL WIRES: No.  TIE DOWNS: No.  BALDWIN: No.    PHYSICAL EXAM:    General: OOB in chair comfortable NAD     Neurological: A&O x 3 RICCI, no focal deficits    Cardiovascular: S1S2 RRR No M/G/R    Respiratory: CTA b/l No W/R/R    Gastrointestinal: Soft, NT/ND    Extremities: No LE edema b/l     Vascular: distal pulses 1 + b/l    Incision Sites: b/l groins soft, NT, no palpable hematoma.  right neck incision healing well.      LABS:                        10.8   9.9   )-----------( 121      ( 14 Jun 2018 07:41 )             32.2       COUMADIN:  No.     PT/INR - ( 12 Jun 2018 18:11 )   PT: 12.4 sec;   INR: 1.11          PTT - ( 12 Jun 2018 18:11 )  PTT:29.6 sec    06-14    138  |  101  |  19  ----------------------------<  174<H>  3.6   |  25  |  0.90    Ca    9.3      14 Jun 2018 07:41  Mg     1.8     06-14    TPro  5.7<L>  /  Alb  3.2<L>  /  TBili  0.5  /  DBili  x   /  AST  38  /  ALT  16  /  AlkPhos  49  06-13    MEDICATIONS  (STANDING):  amLODIPine   Tablet 10 milliGRAM(s) Oral daily  artificial  tears Solution 1 Drop(s) Both EYES daily  aspirin enteric coated 81 milliGRAM(s) Oral daily  ceFAZolin   IVPB 2000 milliGRAM(s) IV Intermittent every 12 hours  clopidogrel Tablet 75 milliGRAM(s) Oral daily  dextrose 5%. 1000 milliLiter(s) (50 mL/Hr) IV Continuous <Continuous>  dextrose 50% Injectable 12.5 Gram(s) IV Push once  dextrose 50% Injectable 25 Gram(s) IV Push once  dextrose 50% Injectable 25 Gram(s) IV Push once  docusate sodium 100 milliGRAM(s) Oral three times a day  famotidine    Tablet 20 milliGRAM(s) Oral daily  furosemide    Tablet 20 milliGRAM(s) Oral daily  heparin  Injectable 5000 Unit(s) SubCutaneous every 8 hours  hydrochlorothiazide 25 milliGRAM(s) Oral daily  insulin lispro (HumaLOG) corrective regimen sliding scale   SubCutaneous every 6 hours  lactated ringers Bolus 500 milliLiter(s) IV Bolus once  lidocaine   Patch 1 Patch Transdermal daily  magnesium oxide 800 milliGRAM(s) Oral once  metoprolol tartrate 12.5 milliGRAM(s) Oral every 12 hours  potassium chloride    Tablet ER 10 milliEquivalent(s) Oral daily  potassium chloride    Tablet ER 40 milliEquivalent(s) Oral once  senna 2 Tablet(s) Oral at bedtime  simvastatin 40 milliGRAM(s) Oral at bedtime  sodium chloride 0.9% lock flush 3 milliLiter(s) IV Push every 8 hours    MEDICATIONS  (PRN):  acetaminophen   Tablet. 650 milliGRAM(s) Oral every 6 hours PRN Mild Pain (1 - 3)  ALPRAZolam 0.5 milliGRAM(s) Oral every 8 hours PRN anxiety  dextrose 40% Gel 15 Gram(s) Oral once PRN Blood Glucose LESS THAN 70 milliGRAM(s)/deciliter  glucagon  Injectable 1 milliGRAM(s) IntraMuscular once PRN Glucose LESS THAN 70 milligrams/deciliter  traMADol 25 milliGRAM(s) Oral every 6 hours PRN Severe Pain (7 - 10)        RADIOLOGY & ADDITIONAL TESTS:

## 2018-06-14 NOTE — PROGRESS NOTE ADULT - ASSESSMENT
87 year old female with history of HTN, HLD, Colon Ca s/p resection 2001, CAD with known RCA , chronic HFpEF, EF 75%, and severe AS evaluated by Dr. Cobos and Dr. Magaña as an outpatient and deemed high risk for surgical AVR and referred to Dr. Moore for procedural evaluation.  She completed workup as an outpatient and on 6/12/18 was admitted to [facility] under the care of Dr. Moore where she underwent direct access TAVR (corevalve) via inominate artery. During the procedure, pt developed LBBB which narrowed post-procedure, now resolved > 24 hours.    Neurovascular: No delirium. Pain well controlled with current regimen.  - Continue tylenol and tramadol PRN    Cardiovascular: Hemodynamically stable. HR controlled. s/p direct access corevalve TAVR, EF 75%  -Intraoperative LBBB, narrow QRS > 24 hours now  -start low dose BB today, monitor tele, and repeat EKG in am to evaluate QRS  -C/w ASA, plavix, statin.     -BP control with Norvasc.  Pt endorses concern with medication regarding past adverse events (gynecomastia, anxiety, and GI disruption).  Discussed with Dr. Moore, will continue while inpatient and observe for adverse events, will likely remain on short term course.  Discussed with patient who agrees with plan.   -Monitor HR/BP/Tele    Respiratory: Saturates well on RA.   - CXR stable   - Continue to wean O2 to maintain SaO2 > 93%   - Encourage C+DB and Use of IS 10x / hr while awake..    GI: Stable.  - Pepcid 20mg for GI ppx   - Bowel regimen     Renal / : BUN/Cr: 18/0.93; no active issues   -Received 20 PO lasix this am and HCTZ, goal even I/O will monitor throughout the day, encourage PO intake, and will bolus IVF if necessary  -Monitor renal function.  -Monitor I/O's.    Endocrine: A1C: 5.6, TSH: 1.176  - Continue insulin sliding scale   - monitor fingersticks, currently well managed    ID:   -, afebrile, asymptomatic.     Heme:  -DVT ppx: HSQ 5000u q8h and SCDs    Disposition: likely home tomorrow

## 2018-06-15 ENCOUNTER — TRANSCRIPTION ENCOUNTER (OUTPATIENT)
Age: 83
End: 2018-06-15

## 2018-06-15 VITALS — TEMPERATURE: 98 F

## 2018-06-15 LAB
ANION GAP SERPL CALC-SCNC: 10 MMOL/L — SIGNIFICANT CHANGE UP (ref 5–17)
BUN SERPL-MCNC: 27 MG/DL — HIGH (ref 7–23)
CALCIUM SERPL-MCNC: 8.6 MG/DL — SIGNIFICANT CHANGE UP (ref 8.4–10.5)
CHLORIDE SERPL-SCNC: 104 MMOL/L — SIGNIFICANT CHANGE UP (ref 96–108)
CO2 SERPL-SCNC: 28 MMOL/L — SIGNIFICANT CHANGE UP (ref 22–31)
CREAT SERPL-MCNC: 1.09 MG/DL — SIGNIFICANT CHANGE UP (ref 0.5–1.3)
GLUCOSE BLDC GLUCOMTR-MCNC: 117 MG/DL — HIGH (ref 70–99)
GLUCOSE BLDC GLUCOMTR-MCNC: 122 MG/DL — HIGH (ref 70–99)
GLUCOSE SERPL-MCNC: 115 MG/DL — HIGH (ref 70–99)
HCT VFR BLD CALC: 29 % — LOW (ref 34.5–45)
HGB BLD-MCNC: 9.5 G/DL — LOW (ref 11.5–15.5)
MCHC RBC-ENTMCNC: 31.7 PG — SIGNIFICANT CHANGE UP (ref 27–34)
MCHC RBC-ENTMCNC: 32.8 G/DL — SIGNIFICANT CHANGE UP (ref 32–36)
MCV RBC AUTO: 96.7 FL — SIGNIFICANT CHANGE UP (ref 80–100)
PLATELET # BLD AUTO: 119 K/UL — LOW (ref 150–400)
POTASSIUM SERPL-MCNC: 4.3 MMOL/L — SIGNIFICANT CHANGE UP (ref 3.5–5.3)
POTASSIUM SERPL-SCNC: 4.3 MMOL/L — SIGNIFICANT CHANGE UP (ref 3.5–5.3)
RBC # BLD: 3 M/UL — LOW (ref 3.8–5.2)
RBC # FLD: 15.4 % — SIGNIFICANT CHANGE UP (ref 10.3–16.9)
SODIUM SERPL-SCNC: 142 MMOL/L — SIGNIFICANT CHANGE UP (ref 135–145)
WBC # BLD: 7.7 K/UL — SIGNIFICANT CHANGE UP (ref 3.8–10.5)
WBC # FLD AUTO: 7.7 K/UL — SIGNIFICANT CHANGE UP (ref 3.8–10.5)

## 2018-06-15 PROCEDURE — C1894: CPT

## 2018-06-15 PROCEDURE — 85027 COMPLETE CBC AUTOMATED: CPT

## 2018-06-15 PROCEDURE — C1726: CPT

## 2018-06-15 PROCEDURE — 86901 BLOOD TYPING SEROLOGIC RH(D): CPT

## 2018-06-15 PROCEDURE — 84295 ASSAY OF SERUM SODIUM: CPT

## 2018-06-15 PROCEDURE — 85610 PROTHROMBIN TIME: CPT

## 2018-06-15 PROCEDURE — 83735 ASSAY OF MAGNESIUM: CPT

## 2018-06-15 PROCEDURE — 93005 ELECTROCARDIOGRAM TRACING: CPT

## 2018-06-15 PROCEDURE — 80053 COMPREHEN METABOLIC PANEL: CPT

## 2018-06-15 PROCEDURE — C1889: CPT

## 2018-06-15 PROCEDURE — 36430 TRANSFUSION BLD/BLD COMPNT: CPT

## 2018-06-15 PROCEDURE — 86923 COMPATIBILITY TEST ELECTRIC: CPT

## 2018-06-15 PROCEDURE — 82803 BLOOD GASES ANY COMBINATION: CPT

## 2018-06-15 PROCEDURE — 84443 ASSAY THYROID STIM HORMONE: CPT

## 2018-06-15 PROCEDURE — 82330 ASSAY OF CALCIUM: CPT

## 2018-06-15 PROCEDURE — 71045 X-RAY EXAM CHEST 1 VIEW: CPT

## 2018-06-15 PROCEDURE — L8699: CPT

## 2018-06-15 PROCEDURE — 80048 BASIC METABOLIC PNL TOTAL CA: CPT

## 2018-06-15 PROCEDURE — 80076 HEPATIC FUNCTION PANEL: CPT

## 2018-06-15 PROCEDURE — 82962 GLUCOSE BLOOD TEST: CPT

## 2018-06-15 PROCEDURE — C1769: CPT

## 2018-06-15 PROCEDURE — 86900 BLOOD TYPING SEROLOGIC ABO: CPT

## 2018-06-15 PROCEDURE — 93010 ELECTROCARDIOGRAM REPORT: CPT

## 2018-06-15 PROCEDURE — 83880 ASSAY OF NATRIURETIC PEPTIDE: CPT

## 2018-06-15 PROCEDURE — P9016: CPT

## 2018-06-15 PROCEDURE — 97161 PT EVAL LOW COMPLEX 20 MIN: CPT

## 2018-06-15 PROCEDURE — 93306 TTE W/DOPPLER COMPLETE: CPT

## 2018-06-15 PROCEDURE — 93312 ECHO TRANSESOPHAGEAL: CPT

## 2018-06-15 PROCEDURE — 99238 HOSP IP/OBS DSCHRG MGMT 30/<: CPT

## 2018-06-15 PROCEDURE — 86850 RBC ANTIBODY SCREEN: CPT

## 2018-06-15 PROCEDURE — 85730 THROMBOPLASTIN TIME PARTIAL: CPT

## 2018-06-15 PROCEDURE — 36415 COLL VENOUS BLD VENIPUNCTURE: CPT

## 2018-06-15 PROCEDURE — C1887: CPT

## 2018-06-15 PROCEDURE — 85025 COMPLETE CBC W/AUTO DIFF WBC: CPT

## 2018-06-15 PROCEDURE — 71045 X-RAY EXAM CHEST 1 VIEW: CPT | Mod: 26

## 2018-06-15 PROCEDURE — 84132 ASSAY OF SERUM POTASSIUM: CPT

## 2018-06-15 RX ORDER — METOPROLOL TARTRATE 50 MG
0.5 TABLET ORAL
Qty: 30 | Refills: 0 | OUTPATIENT
Start: 2018-06-15 | End: 2018-07-14

## 2018-06-15 RX ORDER — CARVEDILOL PHOSPHATE 80 MG/1
0 CAPSULE, EXTENDED RELEASE ORAL
Qty: 0 | Refills: 0 | COMMUNITY

## 2018-06-15 RX ORDER — CARVEDILOL PHOSPHATE 80 MG/1
3.12 CAPSULE, EXTENDED RELEASE ORAL EVERY 12 HOURS
Qty: 0 | Refills: 0 | Status: DISCONTINUED | OUTPATIENT
Start: 2018-06-15 | End: 2018-06-15

## 2018-06-15 RX ORDER — SIMVASTATIN 20 MG/1
1 TABLET, FILM COATED ORAL
Qty: 30 | Refills: 0 | OUTPATIENT
Start: 2018-06-15 | End: 2018-07-14

## 2018-06-15 RX ORDER — ALPRAZOLAM 0.25 MG
1 TABLET ORAL
Qty: 90 | Refills: 0 | OUTPATIENT
Start: 2018-06-15 | End: 2018-07-14

## 2018-06-15 RX ORDER — CLOPIDOGREL BISULFATE 75 MG/1
1 TABLET, FILM COATED ORAL
Qty: 30 | Refills: 0 | OUTPATIENT
Start: 2018-06-15 | End: 2018-07-14

## 2018-06-15 RX ORDER — CARVEDILOL PHOSPHATE 80 MG/1
1 CAPSULE, EXTENDED RELEASE ORAL
Qty: 60 | Refills: 0 | OUTPATIENT
Start: 2018-06-15 | End: 2018-07-14

## 2018-06-15 RX ORDER — ASPIRIN/CALCIUM CARB/MAGNESIUM 324 MG
1 TABLET ORAL
Qty: 30 | Refills: 0 | OUTPATIENT
Start: 2018-06-15 | End: 2018-07-14

## 2018-06-15 RX ORDER — ACETAMINOPHEN 500 MG
2 TABLET ORAL
Qty: 240 | Refills: 0 | OUTPATIENT
Start: 2018-06-15 | End: 2018-07-14

## 2018-06-15 RX ORDER — TRAMADOL HYDROCHLORIDE 50 MG/1
0.5 TABLET ORAL
Qty: 14 | Refills: 0 | OUTPATIENT
Start: 2018-06-15 | End: 2018-06-21

## 2018-06-15 RX ORDER — SENNA PLUS 8.6 MG/1
2 TABLET ORAL
Qty: 60 | Refills: 0 | OUTPATIENT
Start: 2018-06-15 | End: 2018-07-14

## 2018-06-15 RX ORDER — FAMOTIDINE 10 MG/ML
1 INJECTION INTRAVENOUS
Qty: 30 | Refills: 0 | OUTPATIENT
Start: 2018-06-15 | End: 2018-07-14

## 2018-06-15 RX ORDER — ASPIRIN/CALCIUM CARB/MAGNESIUM 324 MG
1 TABLET ORAL
Qty: 0 | Refills: 0 | COMMUNITY

## 2018-06-15 RX ORDER — ALPRAZOLAM 0.25 MG
1 TABLET ORAL
Qty: 0 | Refills: 0 | COMMUNITY

## 2018-06-15 RX ORDER — SIMVASTATIN 20 MG/1
1 TABLET, FILM COATED ORAL
Qty: 0 | Refills: 0 | COMMUNITY

## 2018-06-15 RX ORDER — DOCUSATE SODIUM 100 MG
1 CAPSULE ORAL
Qty: 90 | Refills: 0 | OUTPATIENT
Start: 2018-06-15 | End: 2018-07-14

## 2018-06-15 RX ADMIN — TRAMADOL HYDROCHLORIDE 25 MILLIGRAM(S): 50 TABLET ORAL at 02:56

## 2018-06-15 RX ADMIN — Medication 81 MILLIGRAM(S): at 12:27

## 2018-06-15 RX ADMIN — TRAMADOL HYDROCHLORIDE 25 MILLIGRAM(S): 50 TABLET ORAL at 03:45

## 2018-06-15 RX ADMIN — LIDOCAINE 1 PATCH: 4 CREAM TOPICAL at 04:05

## 2018-06-15 RX ADMIN — CARVEDILOL PHOSPHATE 3.12 MILLIGRAM(S): 80 CAPSULE, EXTENDED RELEASE ORAL at 07:04

## 2018-06-15 RX ADMIN — LIDOCAINE 1 PATCH: 4 CREAM TOPICAL at 12:27

## 2018-06-15 RX ADMIN — Medication 100 MILLIGRAM(S): at 07:04

## 2018-06-15 RX ADMIN — SODIUM CHLORIDE 3 MILLILITER(S): 9 INJECTION INTRAMUSCULAR; INTRAVENOUS; SUBCUTANEOUS at 07:05

## 2018-06-15 RX ADMIN — HEPARIN SODIUM 5000 UNIT(S): 5000 INJECTION INTRAVENOUS; SUBCUTANEOUS at 07:05

## 2018-06-15 RX ADMIN — FAMOTIDINE 20 MILLIGRAM(S): 10 INJECTION INTRAVENOUS at 12:27

## 2018-06-15 RX ADMIN — CLOPIDOGREL BISULFATE 75 MILLIGRAM(S): 75 TABLET, FILM COATED ORAL at 12:27

## 2018-06-15 NOTE — DISCHARGE NOTE ADULT - MEDICATION SUMMARY - MEDICATIONS TO STOP TAKING
I will STOP taking the medications listed below when I get home from the hospital:    hydroCHLOROthiazide 25 mg oral tablet  -- 1 tab(s) by mouth once a day I will STOP taking the medications listed below when I get home from the hospital:    hydroCHLOROthiazide 25 mg oral tablet  -- 1 tab(s) by mouth once a day    Coreg 12.5 mg oral tablet  -- orally 2 times a day

## 2018-06-15 NOTE — DISCHARGE NOTE ADULT - MEDICATION SUMMARY - MEDICATIONS TO CHANGE
I will SWITCH the dose or number of times a day I take the medications listed below when I get home from the hospital:    Coreg 12.5 mg oral tablet  -- orally 2 times a day Evacuation of hematoma of head and neck I will SWITCH the dose or number of times a day I take the medications listed below when I get home from the hospital:  None

## 2018-06-15 NOTE — DISCHARGE NOTE ADULT - CARE PLAN
Principal Discharge DX:	Aortic stenosis  Goal:	Recover from hospital stay.  Assessment and plan of treatment:	-Please follow up with Dr. Moore on 6/28/18 at 11:30am.  The office is located at [facility], 4th floor. Call us with any questions #[phone].    -Dr. Sylvia Gibson will follow-up with you on 7/3/18 at 10:00am.  Her office location and phone number are included in your discharge paperwork.     -Walk daily as tolerated and use your incentive spirometer every hour.    -No driving or strenuous activity/exercise for 6 weeks, or until  cleared by your surgeon.    -Gently clean your incisions with anti-bacterial soap and water, pat  dry.  You may leave them open to air.    -Call your doctor if you have shortness of breath, chest pain not  relieved by pain medication, dizziness, fever >101.5, or increased  redness or drainage from incisions.  Goal:	Beta Blocker Dose Change  Assessment and plan of treatment:	You are being discharged on a beta blocker call Carvedilol (Coreg), which you were taking at home.  We have resumed this medication at a lower dose compared to your home medication and you should continue the lower dose until you see Dr. Moore in the office.

## 2018-06-15 NOTE — PROGRESS NOTE ADULT - SUBJECTIVE AND OBJECTIVE BOX
Patient discussed on morning rounds with Dr. Moore     Operation / Date: Direct access TAVR (corevalve) on 6/12/18    Surgeon: Dr. Moore     Referring Physician: Dr. Gibson    SUBJECTIVE ASSESSMENT:  87y Female seen at bedside this AM.  She states that she feels well while walking and is tolerating room air.  When asked, she states that she feels ready to go home today. Denies HA, AMS, CP, palpitations, SOB, cough, hemoptysis, n/v/d, fever.  She endorses mild incisional pain that is well managed with current medication regimen.     Hospital Course:  This is an 87 year old female with history of HTN, HLD, Colon Ca s/p resection 2001, CAD with known RCA , chronic HFpEF, EF 75%, and severe AS evaluated by Dr. Cobos and Dr. Magaña as an outpatient and deemed high risk for surgical AVR and referred to Dr. Moore for procedural evaluation.  She completed workup as an outpatient and on 6/12/18 was admitted to [facility] under the care of Dr. Moore where she underwent direct access TAVR (corevalve) via inominate artery. During the procedure, pt developed LBBB which narrowed post-procedure with consistent narrow QRS on repeat EKG's throughout the remainder of her hospital course.  She remained HD stable, BB resumed on POD 3 with narrow QRS once again demonstrated on repeat EKG.  She ambulated multiple times on room and, was evaluated in AM and medically cleared for discharge to home today with plan for outpatient follow-up.  Over 30 minutes was spent with the patient reviewing the discharge material including medications, follow up appointments, recovery, concerning symptoms, and how to contact their health care providers if they have questions.    Vital Signs Last 24 Hrs  T(C): 36.8 (15 Ten 2018 10:10), Max: 36.8 (15 Ten 2018 10:10)  T(F): 98.2 (15 Ten 2018 10:10), Max: 98.2 (15 Ten 2018 10:10)  HR: 72 (15 Ten 2018 05:02) (72 - 100)  BP: 100/54 (15 Ten 2018 05:02) (96/45 - 140/60)  BP(mean): 80 (15 Ten 2018 05:02) (55 - 95)  RR: 16 (15 Ten 2018 05:02) (16 - 18)  SpO2: 96% (15 Ten 2018 05:02) (94% - 96%)  I&O's Detail    14 Jun 2018 07:01  -  15 Ten 2018 07:00  --------------------------------------------------------  IN:  Total IN: 0 mL    OUT:    Voided: 900 mL  Total OUT: 900 mL    Total NET: -900 mL    EPICARDIAL WIRES REMOVED: NA.  TIE DOWNS REMOVED: Yes.    PHYSICAL EXAM:  General: Resting in bed, no acute distress.   Neurological: AAOx3, no AMS or focal deficits.   Cardiovascular: RRR, S1/S2, no m/r/g.   Respiratory: No acute distress, CTA b/l, no w/r/r.   Gastrointestinal: ND, NBS, non-TTP.  Extremities: Warm and well perfused, no calf ttp b/l.  No edema b/l.   Vascular: Pulses 2+ throughout.   Incision sites: Groin cath site: healing well, no palpable hematoma, C/D/I.  Direct access TAVR incision on R neck: C/D/I, no palpable hematoma.     LABS:                        9.5    7.7   )-----------( 119      ( 15 Ten 2018 06:42 )             29.0       COUMADIN:  No.     06-15    142  |  104  |  27<H>  ----------------------------<  115<H>  4.3   |  28  |  1.09    Ca    8.6      15 Ten 2018 06:41  Mg     1.8     06-14      MEDICATIONS  (STANDING):  I will START or STAY ON the medications listed below when I get home from the hospital:    aspirin 81 mg oral delayed release tablet  -- 1 tab(s) by mouth once a day  -- Indication: For Blood thinner    acetaminophen 325 mg oral tablet  -- 2 tab(s) by mouth every 6 hours, As needed, Mild Pain (1 - 3) MDD:8  -- Indication: For Pain    traMADol 50 mg oral tablet  -- 0.5 tab(s) by mouth every 6 hours, As needed, Severe Pain (7 - 10) MDD:4  -- Indication: For Severe pain    simvastatin 40 mg oral tablet  -- 1 tab(s) by mouth once a day (at bedtime)  -- Indication: For Cholesterol    clopidogrel 75 mg oral tablet  -- 1 tab(s) by mouth once a day  -- Indication: For Blood thinner    ALPRAZolam 0.5 mg oral tablet  -- 1 tab(s) by mouth every 8 hours, As needed, anxiety MDD:3  -- Indication: For Anxiety    carvedilol 3.125 mg oral tablet  -- 1 tab(s) by mouth every 12 hours  -- Indication: For Blood pressure    famotidine 20 mg oral tablet  -- 1 tab(s) by mouth once a day  -- Indication: For Stomach health    docusate sodium 100 mg oral capsule  -- 1 cap(s) by mouth 3 times a day  -- Indication: For Stool softener as needed    senna oral tablet  -- 2 tab(s) by mouth once a day (at bedtime)  -- Indication: For Stool softener as needed    Vitamin D3 5000 intl units oral capsule  -- 1 cap(s) by mouth once a day  -- Indication: For Vitamin.     I will STOP taking the medications listed below when I get home from the hospital:    hydroCHLOROthiazide 25 mg oral tablet  -- 1 tab(s) by mouth once a day.     I will SWITCH the dose or number of times a day I take the medications listed below when I get home from the hospital:    Coreg 12.5 mg oral tablet  -- orally 2 times a day.    Discharge CXR:    < from: Xray Chest 1 View- PORTABLE-Routine (06.15.18 @ 06:41) >    EXAM:  XR CHEST PORTABLE ROUTINE 1V                          PROCEDURE DATE:  06/15/2018          INTERPRETATION:  Chest x-ray    Indication: Shortness of breath    A portable frontal view of the chest is compared to the prior study dated   6/14/2018. Normal heart size. No pulmonary consolidation is seen. No   pleural effusion or pneumothorax.      IMPRESSION: No new infiltrates.    < end of copied text >    Discharge ECHO:    < from: Echocardiogram (06.13.18 @ 10:22) >  Interpretation Summary  Left ventricular hypertrophy presentThere is a septal "knuckle" with no   left   ventricular outflow obstructionThe left ventricular wall motion is   normal.The   left ventricular ejection fraction is estimated to be 65-70%The mitral   inflow   pattern is consistent with impaired left ventricular relaxation with   mildly   elevated left atrial pressure (8-14mmHg).  The left atrial size is   normal.Right atrial size is normal.The right ventricle is normal in size   and   function.Core valve seen in aortic position.There is mild to moderate   aortic   regurgitation.The aortic regurgitation is paravalvular.The mean pressure   gradient is 6.5 mmHg.There is mild mitral valve thickening.There is mild   mitral regurgitation.The mean pressure gradient is 8 mmHg.Structurally   normal   tricuspid valve.There is trace tricuspid regurgitation.The pulmonary   artery   systolic pressure is estimated to be 38 mmHg.No pulmonic regurgitation   noted.No aortic root dilatation.The inferior vena cava is normal in size   (<2.1   cm) with normal inspiratory collapse (>50%) consistent with normal right   atrial pressure.    < end of copied text >    < from: 12 Lead ECG (06.14.18 @ 04:24) >  **Preliminary Report**       Ventricular Rate 95 BPM    Atrial Rate 95 BPM    P-R Interval 246 ms    QRS Duration 108 ms    Q-T Interval 368 ms    QTC Calculation(Bezet) 462 ms    P Axis 54 degrees    R Axis 35 degrees    T Axis 156 degrees    Diagnosis Line Sinus rhythm with 1st degree AV block  Left ventricular hypertrophy with repolarization abnormality  Abnormal ECG    < end of copied text >

## 2018-06-15 NOTE — DISCHARGE NOTE ADULT - PATIENT PORTAL LINK FT
You can access the [facility] Patient Portal, offered by [facility], by registering with the following website: [email or web address]

## 2018-06-15 NOTE — DISCHARGE NOTE ADULT - MEDICATION SUMMARY - MEDICATIONS TO TAKE
I will START or STAY ON the medications listed below when I get home from the hospital:    aspirin 81 mg oral delayed release tablet  -- 1 tab(s) by mouth once a day  -- Indication: For Blood thinner    acetaminophen 325 mg oral tablet  -- 2 tab(s) by mouth every 6 hours, As needed, Mild Pain (1 - 3) MDD:8  -- Indication: For Pain    traMADol 50 mg oral tablet  -- 0.5 tab(s) by mouth every 6 hours, As needed, Severe Pain (7 - 10) MDD:4  -- Indication: For Severe pain    simvastatin 40 mg oral tablet  -- 1 tab(s) by mouth once a day (at bedtime)  -- Indication: For Cholesterol    clopidogrel 75 mg oral tablet  -- 1 tab(s) by mouth once a day  -- Indication: For Blood thinner    ALPRAZolam 0.5 mg oral tablet  -- 1 tab(s) by mouth every 8 hours, As needed, anxiety MDD:3  -- Indication: For Anxiety    carvedilol 3.125 mg oral tablet  -- 1 tab(s) by mouth every 12 hours  -- Indication: For Blood pressure    famotidine 20 mg oral tablet  -- 1 tab(s) by mouth once a day  -- Indication: For Stomach health    docusate sodium 100 mg oral capsule  -- 1 cap(s) by mouth 3 times a day  -- Indication: For Stool softener as needed    senna oral tablet  -- 2 tab(s) by mouth once a day (at bedtime)  -- Indication: For Stool softener as needed    Vitamin D3 5000 intl units oral capsule  -- 1 cap(s) by mouth once a day  -- Indication: For Vitamin I will START or STAY ON the medications listed below when I get home from the hospital:    aspirin 81 mg oral delayed release tablet  -- 1 tab(s) by mouth once a day  -- Indication: For Blood thinner    acetaminophen 325 mg oral tablet  -- 2 tab(s) by mouth every 6 hours, As needed, Mild Pain (1 - 3) MDD:8  -- Indication: For Pain    traMADol 50 mg oral tablet  -- 0.5 tab(s) by mouth every 6 hours, As needed, Severe Pain (7 - 10) MDD:4  -- Indication: For Severe Pain    simvastatin 40 mg oral tablet  -- 1 tab(s) by mouth once a day (at bedtime)  -- Indication: For Cholesterol    clopidogrel 75 mg oral tablet  -- 1 tab(s) by mouth once a day  -- Indication: For Blood thinner    ALPRAZolam 0.5 mg oral tablet  -- 1 tab(s) by mouth every 8 hours, As needed, anxiety MDD:3  -- Indication: For Anxiety    carvedilol 3.125 mg oral tablet  -- 1 tab(s) by mouth every 12 hours  -- Indication: For Blood pressure     famotidine 20 mg oral tablet  -- 1 tab(s) by mouth once a day  -- Indication: For Stomach health    docusate sodium 100 mg oral capsule  -- 1 cap(s) by mouth 3 times a day  -- Indication: For Stool softener    senna oral tablet  -- 2 tab(s) by mouth once a day (at bedtime)  -- Indication: For Stool softener    Vitamin D3 5000 intl units oral capsule  -- 1 cap(s) by mouth once a day  -- Indication: For Vitamin I will START or STAY ON the medications listed below when I get home from the hospital:    aspirin 81 mg oral delayed release tablet  -- 1 tab(s) by mouth once a day  -- Indication: For Blood thinner    acetaminophen 325 mg oral tablet  -- 2 tab(s) by mouth every 6 hours, As needed, Mild Pain (1 - 3) MDD:8  -- Indication: For Pain    traMADol 50 mg oral tablet  -- 0.5 tab(s) by mouth every 6 hours, As needed, Severe Pain (7 - 10) MDD:4  -- Indication: For Severe Pain    simvastatin 40 mg oral tablet  -- 1 tab(s) by mouth once a day (at bedtime)  -- Indication: For Cholesterol    clopidogrel 75 mg oral tablet  -- 1 tab(s) by mouth once a day  -- Indication: For Blood thinner    ALPRAZolam 0.5 mg oral tablet  -- 1 tab(s) by mouth every 8 hours, As needed, anxiety MDD:3  -- Indication: For Anxiety    metoprolol tartrate 25 mg oral tablet  -- 0.5 tab(s) by mouth every 12 hours   -- It is very important that you take or use this exactly as directed.  Do not skip doses or discontinue unless directed by your doctor.  May cause drowsiness.  Alcohol may intensify this effect.  Use care when operating dangerous machinery.  Some non-prescription drugs may aggravate your condition.  Read all labels carefully.  If a warning appears, check with your doctor before taking.  Take with food or milk.  This drug may impair the ability to drive or operate machinery.  Use care until you become familiar with its effects.    -- Indication: For Blood pressure     famotidine 20 mg oral tablet  -- 1 tab(s) by mouth once a day  -- Indication: For Stomach health    docusate sodium 100 mg oral capsule  -- 1 cap(s) by mouth 3 times a day  -- Indication: For Stool softener    senna oral tablet  -- 2 tab(s) by mouth once a day (at bedtime)  -- Indication: For Stool softener    Vitamin D3 5000 intl units oral capsule  -- 1 cap(s) by mouth once a day  -- Indication: For Vitamin

## 2018-06-15 NOTE — DISCHARGE NOTE ADULT - PLAN OF CARE
Recover from hospital stay. -Please follow up with Dr. Moore on 6/28/18 at 11:30am.  The office is located at [facility], 4th floor. Call us with any questions #[phone].    -Dr. Sylvia Gibson will follow-up with you on 7/3/18 at 10:00am.  Her office location and phone number are included in your discharge paperwork.     -Walk daily as tolerated and use your incentive spirometer every hour.    -No driving or strenuous activity/exercise for 6 weeks, or until  cleared by your surgeon.    -Gently clean your incisions with anti-bacterial soap and water, pat  dry.  You may leave them open to air.    -Call your doctor if you have shortness of breath, chest pain not  relieved by pain medication, dizziness, fever >101.5, or increased  redness or drainage from incisions. Beta Blocker Dose Change You are being discharged on a beta blocker call Carvedilol (Coreg), which you were taking at home.  We have resumed this medication at a lower dose compared to your home medication and you should continue the lower dose until you see Dr. Moore in the office.

## 2018-06-15 NOTE — DISCHARGE NOTE ADULT - CARE PROVIDER_API CALL
Ceasar Moore), Cardiology; Interventional Cardiology  [address]  4th [place] [address]  Phone: [phone]  Fax: [phone]    Sylvia Gibson), Cardiovascular Medicine  [address]  [address]  [place] [address]  Phone: [phone]  Fax: [phone] Ceasar Moore), Cardiology; Interventional Cardiology  [address]  4th [place] [address]  Phone: [phone]  Fax: [phone]    Sylvia Gibson), Cardiovascular Medicine  [address]  [address]  [place] [address]  Phone: [phone]  Fax: [phone]

## 2018-06-15 NOTE — DISCHARGE NOTE ADULT - HOSPITAL COURSE
This is an 87 year old female with history of HTN, HLD, Colon Ca s/p resection 2001, CAD with known RCA , chronic HFpEF, EF 75%, and severe AS evaluated by Dr. Cobos and Dr. Magaña as an outpatient and deemed high risk for surgical AVR and referred to Dr. Moore for procedural evaluation.  She completed workup as an outpatient and on 6/12/18 was admitted to [facility] under the care of Dr. Moore where she underwent direct access TAVR (corevalve) via inominate artery. During the procedure, pt developed LBBB which narrowed post-procedure with consistent narrow QRS on repeat EKG's throughout the remainder of her hospital course.  She remained HD stable, BB resumed on POD 3 with narrow QRS once again demonstrated on repeat EKG.  She ambulated multiple times on room and, was evaluated in AM and medically cleared for discharge to home today with plan for outpatient follow-up.  Over 30 minutes was spent with the patient reviewing the discharge material including medications, follow up appointments, recovery, concerning symptoms, and how to contact their health care providers if they have questions.

## 2018-06-15 NOTE — PROGRESS NOTE ADULT - ASSESSMENT
-Please follow up with Dr. Moore on 6/28/18 at 11:30am.  The office is located at [facility], [facility], 4th floor. Call us with any questions #[phone].    -Dr. Sylvia Gibson will follow-up with you on 7/3/18 at 10:00am.  Her office location and phone number are included in your discharge paperwork.

## 2018-06-19 ENCOUNTER — NON-APPOINTMENT (OUTPATIENT)
Age: 83
End: 2018-06-19

## 2018-06-19 ENCOUNTER — APPOINTMENT (OUTPATIENT)
Dept: CARDIOTHORACIC SURGERY | Facility: CLINIC | Age: 83
End: 2018-06-19
Payer: MEDICARE

## 2018-06-19 VITALS
TEMPERATURE: 99.6 F | HEART RATE: 106 BPM | OXYGEN SATURATION: 95 % | DIASTOLIC BLOOD PRESSURE: 79 MMHG | RESPIRATION RATE: 22 BRPM | SYSTOLIC BLOOD PRESSURE: 182 MMHG

## 2018-06-19 PROCEDURE — 93000 ELECTROCARDIOGRAM COMPLETE: CPT

## 2018-06-19 PROCEDURE — 99214 OFFICE O/P EST MOD 30 MIN: CPT

## 2018-06-20 VITALS — DIASTOLIC BLOOD PRESSURE: 67 MMHG | SYSTOLIC BLOOD PRESSURE: 159 MMHG

## 2018-06-26 DIAGNOSIS — I25.10 ATHEROSCLEROTIC HEART DISEASE OF NATIVE CORONARY ARTERY WITHOUT ANGINA PECTORIS: ICD-10-CM

## 2018-06-26 DIAGNOSIS — I35.0 NONRHEUMATIC AORTIC (VALVE) STENOSIS: ICD-10-CM

## 2018-06-26 DIAGNOSIS — Z98.890 OTHER SPECIFIED POSTPROCEDURAL STATES: ICD-10-CM

## 2018-06-26 DIAGNOSIS — I50.32 CHRONIC DIASTOLIC (CONGESTIVE) HEART FAILURE: ICD-10-CM

## 2018-06-26 DIAGNOSIS — E78.5 HYPERLIPIDEMIA, UNSPECIFIED: ICD-10-CM

## 2018-06-26 DIAGNOSIS — I10 ESSENTIAL (PRIMARY) HYPERTENSION: ICD-10-CM

## 2018-06-26 DIAGNOSIS — I44.7 LEFT BUNDLE-BRANCH BLOCK, UNSPECIFIED: ICD-10-CM

## 2018-06-26 DIAGNOSIS — Z90.49 ACQUIRED ABSENCE OF OTHER SPECIFIED PARTS OF DIGESTIVE TRACT: ICD-10-CM

## 2018-06-26 DIAGNOSIS — Z85.038 PERSONAL HISTORY OF OTHER MALIGNANT NEOPLASM OF LARGE INTESTINE: ICD-10-CM

## 2018-06-26 DIAGNOSIS — E78.00 PURE HYPERCHOLESTEROLEMIA, UNSPECIFIED: ICD-10-CM

## 2018-06-26 DIAGNOSIS — I11.0 HYPERTENSIVE HEART DISEASE WITH HEART FAILURE: ICD-10-CM

## 2018-06-26 DIAGNOSIS — Z00.6 ENCOUNTER FOR EXAMINATION FOR NORMAL COMPARISON AND CONTROL IN CLINICAL RESEARCH PROGRAM: ICD-10-CM

## 2018-06-26 DIAGNOSIS — Z88.6 ALLERGY STATUS TO ANALGESIC AGENT: ICD-10-CM

## 2018-06-28 ENCOUNTER — OUTPATIENT (OUTPATIENT)
Dept: OUTPATIENT SERVICES | Facility: HOSPITAL | Age: 83
LOS: 1 days | End: 2018-06-28
Payer: MEDICARE

## 2018-06-28 ENCOUNTER — APPOINTMENT (OUTPATIENT)
Dept: CARDIOTHORACIC SURGERY | Facility: CLINIC | Age: 83
End: 2018-06-28
Payer: MEDICARE

## 2018-06-28 VITALS
BODY MASS INDEX: 22.86 KG/M2 | DIASTOLIC BLOOD PRESSURE: 69 MMHG | HEART RATE: 82 BPM | OXYGEN SATURATION: 97 % | WEIGHT: 121 LBS | TEMPERATURE: 96.9 F | RESPIRATION RATE: 19 BRPM | SYSTOLIC BLOOD PRESSURE: 157 MMHG

## 2018-06-28 DIAGNOSIS — Z98.890 OTHER SPECIFIED POSTPROCEDURAL STATES: Chronic | ICD-10-CM

## 2018-06-28 DIAGNOSIS — I35.0 NONRHEUMATIC AORTIC (VALVE) STENOSIS: ICD-10-CM

## 2018-06-28 LAB
ALBUMIN SERPL ELPH-MCNC: 4 G/DL — SIGNIFICANT CHANGE UP (ref 3.3–5)
ALP SERPL-CCNC: 58 U/L — SIGNIFICANT CHANGE UP (ref 40–120)
ALT FLD-CCNC: 8 U/L — LOW (ref 10–45)
ANION GAP SERPL CALC-SCNC: 15 MMOL/L — SIGNIFICANT CHANGE UP (ref 5–17)
APPEARANCE UR: ABNORMAL
AST SERPL-CCNC: 14 U/L — SIGNIFICANT CHANGE UP (ref 10–40)
BACTERIA # UR AUTO: PRESENT /HPF
BASOPHILS NFR BLD AUTO: 0.2 % — SIGNIFICANT CHANGE UP (ref 0–2)
BILIRUB SERPL-MCNC: 0.3 MG/DL — SIGNIFICANT CHANGE UP (ref 0.2–1.2)
BILIRUB UR-MCNC: NEGATIVE — SIGNIFICANT CHANGE UP
BUN SERPL-MCNC: 22 MG/DL — SIGNIFICANT CHANGE UP (ref 7–23)
CALCIUM SERPL-MCNC: 9.8 MG/DL — SIGNIFICANT CHANGE UP (ref 8.4–10.5)
CHLORIDE SERPL-SCNC: 104 MMOL/L — SIGNIFICANT CHANGE UP (ref 96–108)
CO2 SERPL-SCNC: 25 MMOL/L — SIGNIFICANT CHANGE UP (ref 22–31)
COLOR SPEC: YELLOW — SIGNIFICANT CHANGE UP
COMMENT - URINE: SIGNIFICANT CHANGE UP
CREAT SERPL-MCNC: 1.04 MG/DL — SIGNIFICANT CHANGE UP (ref 0.5–1.3)
DIFF PNL FLD: NEGATIVE — SIGNIFICANT CHANGE UP
EOSINOPHIL NFR BLD AUTO: 1.4 % — SIGNIFICANT CHANGE UP (ref 0–6)
EPI CELLS # UR: ABNORMAL /HPF (ref 0–5)
GLUCOSE SERPL-MCNC: 93 MG/DL — SIGNIFICANT CHANGE UP (ref 70–99)
GLUCOSE UR QL: NEGATIVE — SIGNIFICANT CHANGE UP
HCT VFR BLD CALC: 34.3 % — LOW (ref 34.5–45)
HGB BLD-MCNC: 11.1 G/DL — LOW (ref 11.5–15.5)
KETONES UR-MCNC: ABNORMAL MG/DL
LEUKOCYTE ESTERASE UR-ACNC: ABNORMAL
LYMPHOCYTES # BLD AUTO: 15.7 % — SIGNIFICANT CHANGE UP (ref 13–44)
MCHC RBC-ENTMCNC: 31.4 PG — SIGNIFICANT CHANGE UP (ref 27–34)
MCHC RBC-ENTMCNC: 32.4 G/DL — SIGNIFICANT CHANGE UP (ref 32–36)
MCV RBC AUTO: 96.9 FL — SIGNIFICANT CHANGE UP (ref 80–100)
MONOCYTES NFR BLD AUTO: 11.7 % — SIGNIFICANT CHANGE UP (ref 2–14)
NEUTROPHILS NFR BLD AUTO: 71 % — SIGNIFICANT CHANGE UP (ref 43–77)
NITRITE UR-MCNC: NEGATIVE — SIGNIFICANT CHANGE UP
PH UR: 5.5 — SIGNIFICANT CHANGE UP (ref 5–8)
PLATELET # BLD AUTO: 388 K/UL — SIGNIFICANT CHANGE UP (ref 150–400)
POTASSIUM SERPL-MCNC: 4.4 MMOL/L — SIGNIFICANT CHANGE UP (ref 3.5–5.3)
POTASSIUM SERPL-SCNC: 4.4 MMOL/L — SIGNIFICANT CHANGE UP (ref 3.5–5.3)
PROT SERPL-MCNC: 7.2 G/DL — SIGNIFICANT CHANGE UP (ref 6–8.3)
PROT UR-MCNC: ABNORMAL MG/DL
RBC # BLD: 3.54 M/UL — LOW (ref 3.8–5.2)
RBC # FLD: 14.1 % — SIGNIFICANT CHANGE UP (ref 10.3–16.9)
RBC CASTS # UR COMP ASSIST: < 5 /HPF — SIGNIFICANT CHANGE UP
SODIUM SERPL-SCNC: 144 MMOL/L — SIGNIFICANT CHANGE UP (ref 135–145)
SP GR SPEC: 1.02 — SIGNIFICANT CHANGE UP (ref 1–1.03)
UROBILINOGEN FLD QL: 0.2 E.U./DL — SIGNIFICANT CHANGE UP
WBC # BLD: 5.2 K/UL — SIGNIFICANT CHANGE UP (ref 3.8–10.5)
WBC # FLD AUTO: 5.2 K/UL — SIGNIFICANT CHANGE UP (ref 3.8–10.5)
WBC UR QL: ABNORMAL /HPF

## 2018-06-28 PROCEDURE — 87086 URINE CULTURE/COLONY COUNT: CPT

## 2018-06-28 PROCEDURE — 36415 COLL VENOUS BLD VENIPUNCTURE: CPT

## 2018-06-28 PROCEDURE — 80053 COMPREHEN METABOLIC PANEL: CPT

## 2018-06-28 PROCEDURE — 81001 URINALYSIS AUTO W/SCOPE: CPT

## 2018-06-28 PROCEDURE — 81007 URINE SCREEN FOR BACTERIA: CPT | Mod: QW

## 2018-06-28 PROCEDURE — 85025 COMPLETE CBC W/AUTO DIFF WBC: CPT

## 2018-06-28 PROCEDURE — 99214 OFFICE O/P EST MOD 30 MIN: CPT

## 2018-06-29 DIAGNOSIS — Z87.440 PERSONAL HISTORY OF URINARY (TRACT) INFECTIONS: ICD-10-CM

## 2018-06-29 LAB
CULTURE RESULTS: NO GROWTH — SIGNIFICANT CHANGE UP
SPECIMEN SOURCE: SIGNIFICANT CHANGE UP

## 2018-06-29 RX ORDER — METOPROLOL TARTRATE 25 MG/1
25 TABLET, FILM COATED ORAL TWICE DAILY
Qty: 60 | Refills: 2 | Status: ACTIVE | COMMUNITY
Start: 2018-06-15 | End: 1900-01-01

## 2018-07-13 PROBLEM — Z95.2 STATUS POST TRANSCATHETER AORTIC VALVE REPLACEMENT: Status: ACTIVE | Noted: 2018-07-13

## 2018-07-15 ENCOUNTER — FORM ENCOUNTER (OUTPATIENT)
Age: 83
End: 2018-07-15

## 2018-07-16 ENCOUNTER — OUTPATIENT (OUTPATIENT)
Dept: OUTPATIENT SERVICES | Facility: HOSPITAL | Age: 83
LOS: 1 days | End: 2018-07-16
Payer: MEDICARE

## 2018-07-16 ENCOUNTER — APPOINTMENT (OUTPATIENT)
Dept: CARDIOTHORACIC SURGERY | Facility: CLINIC | Age: 83
End: 2018-07-16
Payer: MEDICARE

## 2018-07-16 VITALS
TEMPERATURE: 97.6 F | SYSTOLIC BLOOD PRESSURE: 168 MMHG | WEIGHT: 118 LBS | RESPIRATION RATE: 20 BRPM | OXYGEN SATURATION: 98 % | HEART RATE: 92 BPM | BODY MASS INDEX: 22.3 KG/M2 | DIASTOLIC BLOOD PRESSURE: 72 MMHG

## 2018-07-16 DIAGNOSIS — Z95.2 PRESENCE OF PROSTHETIC HEART VALVE: ICD-10-CM

## 2018-07-16 DIAGNOSIS — Z98.890 OTHER SPECIFIED POSTPROCEDURAL STATES: Chronic | ICD-10-CM

## 2018-07-16 DIAGNOSIS — I35.9 NONRHEUMATIC AORTIC VALVE DISORDER, UNSPECIFIED: ICD-10-CM

## 2018-07-16 LAB
ALBUMIN SERPL ELPH-MCNC: 3.7 G/DL — SIGNIFICANT CHANGE UP (ref 3.3–5)
ALP SERPL-CCNC: 68 U/L — SIGNIFICANT CHANGE UP (ref 40–120)
ALT FLD-CCNC: 7 U/L — LOW (ref 10–45)
ANION GAP SERPL CALC-SCNC: 15 MMOL/L — SIGNIFICANT CHANGE UP (ref 5–17)
AST SERPL-CCNC: 12 U/L — SIGNIFICANT CHANGE UP (ref 10–40)
BASOPHILS NFR BLD AUTO: 0.4 % — SIGNIFICANT CHANGE UP (ref 0–2)
BILIRUB SERPL-MCNC: 0.3 MG/DL — SIGNIFICANT CHANGE UP (ref 0.2–1.2)
BUN SERPL-MCNC: 29 MG/DL — HIGH (ref 7–23)
CALCIUM SERPL-MCNC: 9.2 MG/DL — SIGNIFICANT CHANGE UP (ref 8.4–10.5)
CHLORIDE SERPL-SCNC: 104 MMOL/L — SIGNIFICANT CHANGE UP (ref 96–108)
CO2 SERPL-SCNC: 25 MMOL/L — SIGNIFICANT CHANGE UP (ref 22–31)
CREAT SERPL-MCNC: 1.07 MG/DL — SIGNIFICANT CHANGE UP (ref 0.5–1.3)
EOSINOPHIL NFR BLD AUTO: 1.1 % — SIGNIFICANT CHANGE UP (ref 0–6)
GLUCOSE SERPL-MCNC: 97 MG/DL — SIGNIFICANT CHANGE UP (ref 70–99)
HCT VFR BLD CALC: 33.4 % — LOW (ref 34.5–45)
HGB BLD-MCNC: 10.9 G/DL — LOW (ref 11.5–15.5)
LYMPHOCYTES # BLD AUTO: 18.1 % — SIGNIFICANT CHANGE UP (ref 13–44)
MCHC RBC-ENTMCNC: 31.2 PG — SIGNIFICANT CHANGE UP (ref 27–34)
MCHC RBC-ENTMCNC: 32.6 G/DL — SIGNIFICANT CHANGE UP (ref 32–36)
MCV RBC AUTO: 95.7 FL — SIGNIFICANT CHANGE UP (ref 80–100)
MONOCYTES NFR BLD AUTO: 16.6 % — HIGH (ref 2–14)
NEUTROPHILS NFR BLD AUTO: 63.8 % — SIGNIFICANT CHANGE UP (ref 43–77)
PLATELET # BLD AUTO: 271 K/UL — SIGNIFICANT CHANGE UP (ref 150–400)
POTASSIUM SERPL-MCNC: 3.7 MMOL/L — SIGNIFICANT CHANGE UP (ref 3.5–5.3)
POTASSIUM SERPL-SCNC: 3.7 MMOL/L — SIGNIFICANT CHANGE UP (ref 3.5–5.3)
PROT SERPL-MCNC: 7.4 G/DL — SIGNIFICANT CHANGE UP (ref 6–8.3)
RBC # BLD: 3.49 M/UL — LOW (ref 3.8–5.2)
RBC # FLD: 14.1 % — SIGNIFICANT CHANGE UP (ref 10.3–16.9)
SODIUM SERPL-SCNC: 144 MMOL/L — SIGNIFICANT CHANGE UP (ref 135–145)
WBC # BLD: 4.5 K/UL — SIGNIFICANT CHANGE UP (ref 3.8–10.5)
WBC # FLD AUTO: 4.5 K/UL — SIGNIFICANT CHANGE UP (ref 3.8–10.5)

## 2018-07-16 PROCEDURE — 85025 COMPLETE CBC W/AUTO DIFF WBC: CPT

## 2018-07-16 PROCEDURE — 93306 TTE W/DOPPLER COMPLETE: CPT

## 2018-07-16 PROCEDURE — 36415 COLL VENOUS BLD VENIPUNCTURE: CPT

## 2018-07-16 PROCEDURE — 99215 OFFICE O/P EST HI 40 MIN: CPT

## 2018-07-16 PROCEDURE — 93005 ELECTROCARDIOGRAM TRACING: CPT

## 2018-07-16 PROCEDURE — 80053 COMPREHEN METABOLIC PANEL: CPT

## 2018-07-16 PROCEDURE — 93306 TTE W/DOPPLER COMPLETE: CPT | Mod: 26

## 2018-07-16 PROCEDURE — 93010 ELECTROCARDIOGRAM REPORT: CPT

## 2018-07-18 RX ORDER — HYDROCHLOROTHIAZIDE 25 MG/1
25 TABLET ORAL
Qty: 30 | Refills: 2 | Status: ACTIVE | COMMUNITY
Start: 2018-07-18 | End: 1900-01-01

## 2018-07-30 ENCOUNTER — APPOINTMENT (OUTPATIENT)
Dept: CARDIOTHORACIC SURGERY | Facility: CLINIC | Age: 83
End: 2018-07-30
Payer: MEDICARE

## 2018-07-30 ENCOUNTER — OUTPATIENT (OUTPATIENT)
Dept: OUTPATIENT SERVICES | Facility: HOSPITAL | Age: 83
LOS: 1 days | End: 2018-07-30
Payer: MEDICARE

## 2018-07-30 VITALS
TEMPERATURE: 97.2 F | SYSTOLIC BLOOD PRESSURE: 179 MMHG | HEART RATE: 75 BPM | RESPIRATION RATE: 18 BRPM | DIASTOLIC BLOOD PRESSURE: 76 MMHG | OXYGEN SATURATION: 98 %

## 2018-07-30 DIAGNOSIS — Z98.890 OTHER SPECIFIED POSTPROCEDURAL STATES: Chronic | ICD-10-CM

## 2018-07-30 LAB
ALBUMIN SERPL ELPH-MCNC: 4 G/DL — SIGNIFICANT CHANGE UP (ref 3.3–5)
ALP SERPL-CCNC: 50 U/L — SIGNIFICANT CHANGE UP (ref 40–120)
ALT FLD-CCNC: 10 U/L — SIGNIFICANT CHANGE UP (ref 10–45)
ANION GAP SERPL CALC-SCNC: 14 MMOL/L — SIGNIFICANT CHANGE UP (ref 5–17)
AST SERPL-CCNC: 13 U/L — SIGNIFICANT CHANGE UP (ref 10–40)
BILIRUB SERPL-MCNC: 0.3 MG/DL — SIGNIFICANT CHANGE UP (ref 0.2–1.2)
BUN SERPL-MCNC: 28 MG/DL — HIGH (ref 7–23)
CALCIUM SERPL-MCNC: 9.4 MG/DL — SIGNIFICANT CHANGE UP (ref 8.4–10.5)
CHLORIDE SERPL-SCNC: 107 MMOL/L — SIGNIFICANT CHANGE UP (ref 96–108)
CO2 SERPL-SCNC: 24 MMOL/L — SIGNIFICANT CHANGE UP (ref 22–31)
CREAT SERPL-MCNC: 1.03 MG/DL — SIGNIFICANT CHANGE UP (ref 0.5–1.3)
GLUCOSE SERPL-MCNC: 88 MG/DL — SIGNIFICANT CHANGE UP (ref 70–99)
POTASSIUM SERPL-MCNC: 4.3 MMOL/L — SIGNIFICANT CHANGE UP (ref 3.5–5.3)
POTASSIUM SERPL-SCNC: 4.3 MMOL/L — SIGNIFICANT CHANGE UP (ref 3.5–5.3)
PROT SERPL-MCNC: 7.2 G/DL — SIGNIFICANT CHANGE UP (ref 6–8.3)
SODIUM SERPL-SCNC: 145 MMOL/L — SIGNIFICANT CHANGE UP (ref 135–145)

## 2018-07-30 PROCEDURE — 80053 COMPREHEN METABOLIC PANEL: CPT

## 2018-07-30 PROCEDURE — 36415 COLL VENOUS BLD VENIPUNCTURE: CPT

## 2018-07-30 PROCEDURE — 99214 OFFICE O/P EST MOD 30 MIN: CPT

## 2018-07-31 DIAGNOSIS — I35.0 NONRHEUMATIC AORTIC (VALVE) STENOSIS: ICD-10-CM

## 2018-08-13 ENCOUNTER — OUTPATIENT (OUTPATIENT)
Dept: OUTPATIENT SERVICES | Facility: HOSPITAL | Age: 83
LOS: 1 days | End: 2018-08-13
Payer: MEDICARE

## 2018-08-13 ENCOUNTER — APPOINTMENT (OUTPATIENT)
Dept: CARDIOTHORACIC SURGERY | Facility: CLINIC | Age: 83
End: 2018-08-13
Payer: MEDICARE

## 2018-08-13 ENCOUNTER — NON-APPOINTMENT (OUTPATIENT)
Age: 83
End: 2018-08-13

## 2018-08-13 VITALS
HEART RATE: 75 BPM | OXYGEN SATURATION: 97 % | SYSTOLIC BLOOD PRESSURE: 191 MMHG | DIASTOLIC BLOOD PRESSURE: 80 MMHG | BODY MASS INDEX: 22.67 KG/M2 | TEMPERATURE: 97.7 F | WEIGHT: 120 LBS | RESPIRATION RATE: 18 BRPM

## 2018-08-13 DIAGNOSIS — Z98.890 OTHER SPECIFIED POSTPROCEDURAL STATES: Chronic | ICD-10-CM

## 2018-08-13 DIAGNOSIS — I35.0 NONRHEUMATIC AORTIC (VALVE) STENOSIS: ICD-10-CM

## 2018-08-13 LAB
ALBUMIN SERPL ELPH-MCNC: 4 G/DL — SIGNIFICANT CHANGE UP (ref 3.3–5)
ALP SERPL-CCNC: 54 U/L — SIGNIFICANT CHANGE UP (ref 40–120)
ALT FLD-CCNC: 11 U/L — SIGNIFICANT CHANGE UP (ref 10–45)
ANION GAP SERPL CALC-SCNC: 13 MMOL/L — SIGNIFICANT CHANGE UP (ref 5–17)
AST SERPL-CCNC: 15 U/L — SIGNIFICANT CHANGE UP (ref 10–40)
BILIRUB SERPL-MCNC: 0.3 MG/DL — SIGNIFICANT CHANGE UP (ref 0.2–1.2)
BUN SERPL-MCNC: 23 MG/DL — SIGNIFICANT CHANGE UP (ref 7–23)
CALCIUM SERPL-MCNC: 9.8 MG/DL — SIGNIFICANT CHANGE UP (ref 8.4–10.5)
CHLORIDE SERPL-SCNC: 104 MMOL/L — SIGNIFICANT CHANGE UP (ref 96–108)
CO2 SERPL-SCNC: 25 MMOL/L — SIGNIFICANT CHANGE UP (ref 22–31)
CREAT SERPL-MCNC: 1.07 MG/DL — SIGNIFICANT CHANGE UP (ref 0.5–1.3)
GLUCOSE SERPL-MCNC: 85 MG/DL — SIGNIFICANT CHANGE UP (ref 70–99)
POTASSIUM SERPL-MCNC: 3.8 MMOL/L — SIGNIFICANT CHANGE UP (ref 3.5–5.3)
POTASSIUM SERPL-SCNC: 3.8 MMOL/L — SIGNIFICANT CHANGE UP (ref 3.5–5.3)
PROT SERPL-MCNC: 7 G/DL — SIGNIFICANT CHANGE UP (ref 6–8.3)
SODIUM SERPL-SCNC: 142 MMOL/L — SIGNIFICANT CHANGE UP (ref 135–145)

## 2018-08-13 PROCEDURE — 80053 COMPREHEN METABOLIC PANEL: CPT

## 2018-08-13 PROCEDURE — 36415 COLL VENOUS BLD VENIPUNCTURE: CPT

## 2018-08-13 PROCEDURE — 99214 OFFICE O/P EST MOD 30 MIN: CPT

## 2019-06-09 ENCOUNTER — FORM ENCOUNTER (OUTPATIENT)
Age: 84
End: 2019-06-09

## 2019-06-10 ENCOUNTER — OUTPATIENT (OUTPATIENT)
Dept: OUTPATIENT SERVICES | Facility: HOSPITAL | Age: 84
LOS: 1 days | End: 2019-06-10
Payer: MEDICARE

## 2019-06-10 ENCOUNTER — APPOINTMENT (OUTPATIENT)
Dept: CARDIOTHORACIC SURGERY | Facility: CLINIC | Age: 84
End: 2019-06-10
Payer: MEDICARE

## 2019-06-10 VITALS
SYSTOLIC BLOOD PRESSURE: 204 MMHG | WEIGHT: 120 LBS | OXYGEN SATURATION: 95 % | DIASTOLIC BLOOD PRESSURE: 89 MMHG | HEIGHT: 61 IN | BODY MASS INDEX: 22.66 KG/M2 | HEART RATE: 86 BPM | RESPIRATION RATE: 18 BRPM | TEMPERATURE: 97.2 F

## 2019-06-10 DIAGNOSIS — Z98.890 OTHER SPECIFIED POSTPROCEDURAL STATES: Chronic | ICD-10-CM

## 2019-06-10 DIAGNOSIS — I35.0 NONRHEUMATIC AORTIC (VALVE) STENOSIS: ICD-10-CM

## 2019-06-10 LAB
ALBUMIN SERPL ELPH-MCNC: 4.1 G/DL — SIGNIFICANT CHANGE UP (ref 3.3–5)
ALP SERPL-CCNC: 69 U/L — SIGNIFICANT CHANGE UP (ref 40–120)
ALT FLD-CCNC: 9 U/L — LOW (ref 10–45)
ANION GAP SERPL CALC-SCNC: 14 MMOL/L — SIGNIFICANT CHANGE UP (ref 5–17)
AST SERPL-CCNC: 16 U/L — SIGNIFICANT CHANGE UP (ref 10–40)
BASOPHILS # BLD AUTO: 0.02 K/UL — SIGNIFICANT CHANGE UP (ref 0–0.2)
BASOPHILS NFR BLD AUTO: 0.3 % — SIGNIFICANT CHANGE UP (ref 0–2)
BILIRUB SERPL-MCNC: 0.2 MG/DL — SIGNIFICANT CHANGE UP (ref 0.2–1.2)
BUN SERPL-MCNC: 30 MG/DL — HIGH (ref 7–23)
CALCIUM SERPL-MCNC: 9.7 MG/DL — SIGNIFICANT CHANGE UP (ref 8.4–10.5)
CHLORIDE SERPL-SCNC: 105 MMOL/L — SIGNIFICANT CHANGE UP (ref 96–108)
CO2 SERPL-SCNC: 25 MMOL/L — SIGNIFICANT CHANGE UP (ref 22–31)
CREAT SERPL-MCNC: 1.19 MG/DL — SIGNIFICANT CHANGE UP (ref 0.5–1.3)
EOSINOPHIL # BLD AUTO: 0.05 K/UL — SIGNIFICANT CHANGE UP (ref 0–0.5)
EOSINOPHIL NFR BLD AUTO: 0.7 % — SIGNIFICANT CHANGE UP (ref 0–6)
GLUCOSE SERPL-MCNC: 134 MG/DL — HIGH (ref 70–99)
HCT VFR BLD CALC: 36.9 % — SIGNIFICANT CHANGE UP (ref 34.5–45)
HGB BLD-MCNC: 12.3 G/DL — SIGNIFICANT CHANGE UP (ref 11.5–15.5)
IMM GRANULOCYTES NFR BLD AUTO: 0.3 % — SIGNIFICANT CHANGE UP (ref 0–1.5)
LYMPHOCYTES # BLD AUTO: 1.42 K/UL — SIGNIFICANT CHANGE UP (ref 1–3.3)
LYMPHOCYTES # BLD AUTO: 19.2 % — SIGNIFICANT CHANGE UP (ref 13–44)
MCHC RBC-ENTMCNC: 32.7 PG — SIGNIFICANT CHANGE UP (ref 27–34)
MCHC RBC-ENTMCNC: 33.3 GM/DL — SIGNIFICANT CHANGE UP (ref 32–36)
MCV RBC AUTO: 98.1 FL — SIGNIFICANT CHANGE UP (ref 80–100)
MONOCYTES # BLD AUTO: 1.03 K/UL — HIGH (ref 0–0.9)
MONOCYTES NFR BLD AUTO: 13.9 % — SIGNIFICANT CHANGE UP (ref 2–14)
NEUTROPHILS # BLD AUTO: 4.87 K/UL — SIGNIFICANT CHANGE UP (ref 1.8–7.4)
NEUTROPHILS NFR BLD AUTO: 65.6 % — SIGNIFICANT CHANGE UP (ref 43–77)
NRBC # BLD: 0 /100 WBCS — SIGNIFICANT CHANGE UP (ref 0–0)
PLATELET # BLD AUTO: 231 K/UL — SIGNIFICANT CHANGE UP (ref 150–400)
POTASSIUM SERPL-MCNC: 3.9 MMOL/L — SIGNIFICANT CHANGE UP (ref 3.5–5.3)
POTASSIUM SERPL-SCNC: 3.9 MMOL/L — SIGNIFICANT CHANGE UP (ref 3.5–5.3)
PROT SERPL-MCNC: 7.3 G/DL — SIGNIFICANT CHANGE UP (ref 6–8.3)
RBC # BLD: 3.76 M/UL — LOW (ref 3.8–5.2)
RBC # FLD: 13.9 % — SIGNIFICANT CHANGE UP (ref 10.3–14.5)
SODIUM SERPL-SCNC: 144 MMOL/L — SIGNIFICANT CHANGE UP (ref 135–145)
WBC # BLD: 7.41 K/UL — SIGNIFICANT CHANGE UP (ref 3.8–10.5)
WBC # FLD AUTO: 7.41 K/UL — SIGNIFICANT CHANGE UP (ref 3.8–10.5)

## 2019-06-10 PROCEDURE — 36415 COLL VENOUS BLD VENIPUNCTURE: CPT

## 2019-06-10 PROCEDURE — 93306 TTE W/DOPPLER COMPLETE: CPT | Mod: 26

## 2019-06-10 PROCEDURE — 93010 ELECTROCARDIOGRAM REPORT: CPT

## 2019-06-10 PROCEDURE — 93306 TTE W/DOPPLER COMPLETE: CPT

## 2019-06-10 PROCEDURE — 85025 COMPLETE CBC W/AUTO DIFF WBC: CPT

## 2019-06-10 PROCEDURE — 93005 ELECTROCARDIOGRAM TRACING: CPT

## 2019-06-10 PROCEDURE — 80053 COMPREHEN METABOLIC PANEL: CPT

## 2019-06-10 PROCEDURE — 99214 OFFICE O/P EST MOD 30 MIN: CPT

## 2019-06-11 VITALS — SYSTOLIC BLOOD PRESSURE: 190 MMHG | DIASTOLIC BLOOD PRESSURE: 75 MMHG

## 2019-06-11 RX ORDER — LISINOPRIL 5 MG/1
5 TABLET ORAL EVERY MORNING
Refills: 0 | Status: ACTIVE | COMMUNITY

## 2019-06-11 NOTE — HISTORY OF PRESENT ILLNESS
[FreeTextEntry1] : 88 year old female with a history of uncontrolled hypertension, hyperlipidemia, history of Colon Ca s/p colon resection (2001; no radiation/no chemotherapy), CAD (known RCA ) and chronic diastolic heart failure with severe aortic stenosis s/p transinnominate TAVR (EvolutPro 26mm) on 6/12/18 who presents for her one year follow up. \par \par The patient states her shortness of breath with exertion has improved since the procedure but she still experiences it with walking. She states she is able to walk unlimited distances but the SOB makes her walk slower. The patient denies chest pain, orthopnea, PND, dizziness, syncope, LE edema and palpitations. Her SBP remains high at home; she states it is typically 150-160 but can go as high as 190-200.

## 2019-06-11 NOTE — PHYSICAL EXAM
[Normal Jugular Venous V Waves Present] : normal jugular venous V waves present [Murmurs] : no murmurs present

## 2019-06-11 NOTE — REVIEW OF SYSTEMS
[Feeling Fatigued] : not feeling fatigued [Shortness Of Breath] : no shortness of breath [Chest  Pressure] : no chest pressure [Chest Pain] : no chest pain [Lower Ext Edema] : no extremity edema [Leg Claudication] : no intermittent leg claudication [Palpitations] : no palpitations

## 2019-12-31 ENCOUNTER — APPOINTMENT (OUTPATIENT)
Dept: HEART AND VASCULAR | Facility: CLINIC | Age: 84
End: 2019-12-31

## 2020-12-16 PROBLEM — Z87.440 HISTORY OF URINARY TRACT INFECTION: Status: RESOLVED | Noted: 2018-06-29 | Resolved: 2020-12-16

## 2023-02-01 ENCOUNTER — APPOINTMENT (OUTPATIENT)
Dept: OTOLARYNGOLOGY | Facility: CLINIC | Age: 88
End: 2023-02-01
Payer: MEDICARE

## 2023-02-01 VITALS
HEART RATE: 60 BPM | TEMPERATURE: 97.7 F | DIASTOLIC BLOOD PRESSURE: 55 MMHG | BODY MASS INDEX: 23.16 KG/M2 | HEIGHT: 60 IN | WEIGHT: 118 LBS | OXYGEN SATURATION: 99 % | SYSTOLIC BLOOD PRESSURE: 165 MMHG

## 2023-02-01 DIAGNOSIS — H61.23 IMPACTED CERUMEN, BILATERAL: ICD-10-CM

## 2023-02-01 PROCEDURE — G0268 REMOVAL OF IMPACTED WAX MD: CPT

## 2023-02-01 PROCEDURE — 99203 OFFICE O/P NEW LOW 30 MIN: CPT | Mod: 25

## 2023-02-01 NOTE — PHYSICAL EXAM
[Normal] : no nystagmus [de-identified] : b copious cerumen removed atraumatically with suction, b TMs nl  [de-identified] : could not cooperatee for this [de-identified] : gait unsteady

## 2023-02-01 NOTE — REASON FOR VISIT
[Initial Evaluation] : an initial evaluation for [FreeTextEntry2] : vertigo, hearing loss, l tinnitus

## 2023-02-01 NOTE — HISTORY OF PRESENT ILLNESS
[de-identified] : 89 y/o F presenting with vertigo for the past 5 years. She has h/o heart valve surgery in June 2018 and states dizziness started after surgery. It is worse when she changes head position to the left and it lasts for a few seconds. She denies true vertigo, but feels unsteady on her feet. She states she has fell four times, most recently two weeks ago at the bus stop. She is wearing a right arm brace but states she did not fracture her arm. She has h/o hearing loss and wears hearing aids from Dr. Stevens. She has seen two ENTs in the past and was referred by Dr. Stevens for a third opinion. She said she has had no imaging and no vestibular tests. She has done physical therapy in the past and stopped about 1 month ago, but did not feel it helped her dizziness. She said she has seen her cardiologist who is aware of dizziness. She is c/o l longstanding high pitched continuous tinnitus. She denies pressure sensation in the ears. No FH or SH pertinent to cc. Nonsmoker.

## 2023-02-01 NOTE — PROCEDURE
[Cerumen Impaction] : Cerumen Impaction [Same] : same as the Pre Op Dx. [] : Removal of Cerumen [FreeTextEntry5] : b copious cerumen removed atraumatically with suction, b TMs nl

## 2023-02-15 ENCOUNTER — APPOINTMENT (OUTPATIENT)
Dept: OTOLARYNGOLOGY | Facility: CLINIC | Age: 88
End: 2023-02-15
Payer: MEDICARE

## 2023-02-15 ENCOUNTER — APPOINTMENT (OUTPATIENT)
Dept: OTOLARYNGOLOGY | Facility: CLINIC | Age: 88
End: 2023-02-15

## 2023-02-15 VITALS
TEMPERATURE: 97.1 F | HEIGHT: 60 IN | BODY MASS INDEX: 23.16 KG/M2 | OXYGEN SATURATION: 99 % | HEART RATE: 67 BPM | WEIGHT: 118 LBS | DIASTOLIC BLOOD PRESSURE: 54 MMHG | SYSTOLIC BLOOD PRESSURE: 150 MMHG

## 2023-02-15 DIAGNOSIS — H90.3 SENSORINEURAL HEARING LOSS, BILATERAL: ICD-10-CM

## 2023-02-15 PROCEDURE — 99214 OFFICE O/P EST MOD 30 MIN: CPT

## 2023-02-15 PROCEDURE — 92550 TYMPANOMETRY & REFLEX THRESH: CPT | Mod: 52

## 2023-02-15 PROCEDURE — 92557 COMPREHENSIVE HEARING TEST: CPT

## 2023-02-15 NOTE — DATA REVIEWED
[de-identified] :  showed b severe snhl -results reviewed with pt  [de-identified] : MRI showed chronic white matter changes otherwise unremarkable -results reviewed with pt  [de-identified] : US Carotids showed carotid atherosclerosis and 50-69% stenosis -results reviewed with pt

## 2023-02-15 NOTE — ASSESSMENT
[FreeTextEntry1] : 1. b snhl\par - showed b severe snhl -results reviewed with pt \par -recommended ha adjustment- audio also discussed this with her\par 2. vertigo\par -MRI showed chronic white matter changes otherwise unremarkable -results reviewed with pt \par -US Carotids showed carotid atherosclerosis and 50-69% stenosis -results reviewed with pt \par -recommended followup with Dr. Polk,  name and contact information provided \par -VNG reordered and explained to pt she should not take alprazolam or other sedative before\par RTC with VNG to review findings \par

## 2023-02-15 NOTE — HISTORY OF PRESENT ILLNESS
[de-identified] : 2 week followup visit for this 89 y/o F with vertigo and hearing loss. She is here to review , MRI and US Carotids. She did not have VNG because she took xanax to help her sleep at about 2 AM.

## 2023-03-13 ENCOUNTER — APPOINTMENT (OUTPATIENT)
Dept: OTOLARYNGOLOGY | Facility: CLINIC | Age: 88
End: 2023-03-13

## 2023-03-13 ENCOUNTER — APPOINTMENT (OUTPATIENT)
Dept: OTOLARYNGOLOGY | Facility: CLINIC | Age: 88
End: 2023-03-13
Payer: MEDICARE

## 2023-03-13 VITALS
OXYGEN SATURATION: 99 % | SYSTOLIC BLOOD PRESSURE: 198 MMHG | HEART RATE: 61 BPM | RESPIRATION RATE: 18 BRPM | DIASTOLIC BLOOD PRESSURE: 64 MMHG

## 2023-03-13 VITALS
TEMPERATURE: 97.4 F | SYSTOLIC BLOOD PRESSURE: 209 MMHG | OXYGEN SATURATION: 99 % | HEART RATE: 100 BPM | HEIGHT: 60 IN | BODY MASS INDEX: 21.99 KG/M2 | DIASTOLIC BLOOD PRESSURE: 75 MMHG | WEIGHT: 112 LBS

## 2023-03-13 VITALS — DIASTOLIC BLOOD PRESSURE: 65 MMHG | SYSTOLIC BLOOD PRESSURE: 193 MMHG

## 2023-03-13 VITALS — SYSTOLIC BLOOD PRESSURE: 200 MMHG | DIASTOLIC BLOOD PRESSURE: 68 MMHG

## 2023-03-13 VITALS — SYSTOLIC BLOOD PRESSURE: 200 MMHG | DIASTOLIC BLOOD PRESSURE: 67 MMHG

## 2023-03-13 DIAGNOSIS — I10 ESSENTIAL (PRIMARY) HYPERTENSION: ICD-10-CM

## 2023-03-13 DIAGNOSIS — R42 DIZZINESS AND GIDDINESS: ICD-10-CM

## 2023-03-13 PROCEDURE — 99213 OFFICE O/P EST LOW 20 MIN: CPT

## 2023-03-13 NOTE — HISTORY OF PRESENT ILLNESS
[de-identified] : 1 month followup visit for this 89 y/o F here with her friend with vertigo and b snhl. She is here to have VNG and had three elevated blood pressure readings which ranged from 200s/60s. She had US Carotids which showed carotid atherosclerosis and 50-69% stenosis and we recommended she see Dr. Polk, but she has not seen him.

## 2023-03-13 NOTE — ASSESSMENT
[FreeTextEntry1] : vertigo\par -pt's BP was 209/75 in RUE, rpt was 200/67 in LUE and 193/65 in LUE, 4th reading was 193/65 in LUE- she has h/o HTN and is on three medications for this-she confirmed she took all three medications this morning. We recommended she go to emergency room for elevated blood pressure. We spoke with her cardiologist, Dr Ribeiro,  who could not see her today and said she should go toER. Patient agreed with this and ambulance was called\par  - sent to [facility], where Dr Ribeiro works.\par asked to follow up when cleared and bp has been controlled.

## 2023-06-28 NOTE — ASSESSMENT
[FreeTextEntry1] : 1. vertigo\par -\par -MRI\par -VNG\par -US Carotids\par 2. b cerumen impaction \par -cerumen removed\par -ears felt better\par RTC with , MRI, VNG, US Carotids to review findings  First Trimester Sonogram